# Patient Record
Sex: MALE | Race: WHITE | Employment: OTHER | URBAN - METROPOLITAN AREA
[De-identification: names, ages, dates, MRNs, and addresses within clinical notes are randomized per-mention and may not be internally consistent; named-entity substitution may affect disease eponyms.]

---

## 2019-03-18 ENCOUNTER — OFFICE VISIT (OUTPATIENT)
Dept: SLEEP MEDICINE | Age: 49
End: 2019-03-18
Payer: COMMERCIAL

## 2019-03-18 VITALS
RESPIRATION RATE: 16 BRPM | TEMPERATURE: 98.4 F | BODY MASS INDEX: 45.1 KG/M2 | DIASTOLIC BLOOD PRESSURE: 80 MMHG | OXYGEN SATURATION: 95 % | SYSTOLIC BLOOD PRESSURE: 130 MMHG | HEART RATE: 71 BPM | HEIGHT: 70 IN | WEIGHT: 315 LBS

## 2019-03-18 DIAGNOSIS — G47.33 OSA (OBSTRUCTIVE SLEEP APNEA): Primary | ICD-10-CM

## 2019-03-18 DIAGNOSIS — E66.01 CLASS 3 SEVERE OBESITY DUE TO EXCESS CALORIES WITH SERIOUS COMORBIDITY AND BODY MASS INDEX (BMI) OF 45.0 TO 49.9 IN ADULT (HCC): ICD-10-CM

## 2019-03-18 DIAGNOSIS — I10 ESSENTIAL HYPERTENSION: ICD-10-CM

## 2019-03-18 PROCEDURE — G8417 CALC BMI ABV UP PARAM F/U: HCPCS | Performed by: PSYCHIATRY & NEUROLOGY

## 2019-03-18 PROCEDURE — G8428 CUR MEDS NOT DOCUMENT: HCPCS | Performed by: PSYCHIATRY & NEUROLOGY

## 2019-03-18 PROCEDURE — 99204 OFFICE O/P NEW MOD 45 MIN: CPT | Performed by: PSYCHIATRY & NEUROLOGY

## 2019-03-18 PROCEDURE — G8484 FLU IMMUNIZE NO ADMIN: HCPCS | Performed by: PSYCHIATRY & NEUROLOGY

## 2019-03-18 PROCEDURE — 1036F TOBACCO NON-USER: CPT | Performed by: PSYCHIATRY & NEUROLOGY

## 2019-03-18 RX ORDER — LISINOPRIL AND HYDROCHLOROTHIAZIDE 12.5; 1 MG/1; MG/1
1 TABLET ORAL DAILY
COMMUNITY
Start: 2019-03-03 | End: 2022-04-18 | Stop reason: ALTCHOICE

## 2019-03-18 ASSESSMENT — SLEEP AND FATIGUE QUESTIONNAIRES
ESS TOTAL SCORE: 16
HOW LIKELY ARE YOU TO NOD OFF OR FALL ASLEEP WHILE LYING DOWN TO REST IN THE AFTERNOON WHEN CIRCUMSTANCES PERMIT: 2
HOW LIKELY ARE YOU TO NOD OFF OR FALL ASLEEP WHILE SITTING QUIETLY AFTER LUNCH WITHOUT ALCOHOL: 2
HOW LIKELY ARE YOU TO NOD OFF OR FALL ASLEEP WHILE SITTING AND TALKING TO SOMEONE: 1
HOW LIKELY ARE YOU TO NOD OFF OR FALL ASLEEP WHILE SITTING AND READING: 2
NECK CIRCUMFERENCE (INCHES): 21
HOW LIKELY ARE YOU TO NOD OFF OR FALL ASLEEP WHEN YOU ARE A PASSENGER IN A CAR FOR AN HOUR WITHOUT A BREAK: 2
HOW LIKELY ARE YOU TO NOD OFF OR FALL ASLEEP WHILE SITTING INACTIVE IN A PUBLIC PLACE: 2
HOW LIKELY ARE YOU TO NOD OFF OR FALL ASLEEP WHILE WATCHING TV: 3
HOW LIKELY ARE YOU TO NOD OFF OR FALL ASLEEP IN A CAR, WHILE STOPPED FOR A FEW MINUTES IN TRAFFIC: 2

## 2019-03-18 ASSESSMENT — ENCOUNTER SYMPTOMS
GASTROINTESTINAL NEGATIVE: 1
EYES NEGATIVE: 1
APNEA: 1
CHOKING: 1
ALLERGIC/IMMUNOLOGIC NEGATIVE: 1

## 2019-03-31 ENCOUNTER — HOSPITAL ENCOUNTER (OUTPATIENT)
Dept: SLEEP CENTER | Age: 49
Discharge: HOME | End: 2019-03-31
Payer: COMMERCIAL

## 2019-03-31 DIAGNOSIS — E66.01 CLASS 3 SEVERE OBESITY DUE TO EXCESS CALORIES WITH SERIOUS COMORBIDITY AND BODY MASS INDEX (BMI) OF 45.0 TO 49.9 IN ADULT (HCC): ICD-10-CM

## 2019-03-31 DIAGNOSIS — G47.33 OSA (OBSTRUCTIVE SLEEP APNEA): ICD-10-CM

## 2019-03-31 DIAGNOSIS — I10 ESSENTIAL HYPERTENSION: ICD-10-CM

## 2019-03-31 PROCEDURE — 95810 POLYSOM 6/> YRS 4/> PARAM: CPT

## 2019-03-31 PROCEDURE — 95810 POLYSOM 6/> YRS 4/> PARAM: CPT | Performed by: PSYCHIATRY & NEUROLOGY

## 2019-04-04 ENCOUNTER — TELEPHONE (OUTPATIENT)
Dept: PULMONOLOGY | Age: 49
End: 2019-04-04

## 2019-04-04 NOTE — TELEPHONE ENCOUNTER
Spoke to [address] 89 to let him know that his PSG study indicated he was positive for severe THERESA with and AHI of 71.7 events/hr with of Sats as low as 73% at times  A titration study is indicated  Gave the number to schedule this

## 2020-06-30 ENCOUNTER — HOSPITAL ENCOUNTER (OUTPATIENT)
Dept: ULTRASOUND IMAGING | Age: 50
Discharge: HOME | End: 2020-06-30
Payer: COMMERCIAL

## 2020-06-30 PROCEDURE — 76705 ECHO EXAM OF ABDOMEN: CPT

## 2021-10-14 ENCOUNTER — HOSPITAL ENCOUNTER (OUTPATIENT)
Dept: NON INVASIVE DIAGNOSTICS | Age: 51
Discharge: HOME | End: 2021-10-14
Payer: COMMERCIAL

## 2021-10-14 DIAGNOSIS — R07.9 CHEST PAIN, UNSPECIFIED TYPE: ICD-10-CM

## 2021-10-14 LAB
LV EF: 63 %
LVEF MODALITY: NORMAL

## 2021-10-14 PROCEDURE — 3430000000 HC RX DIAGNOSTIC RADIOPHARMACEUTICAL: Performed by: INTERNAL MEDICINE

## 2021-10-14 PROCEDURE — 93017 CV STRESS TEST TRACING ONLY: CPT

## 2021-10-14 PROCEDURE — A9502 TC99M TETROFOSMIN: HCPCS | Performed by: INTERNAL MEDICINE

## 2021-10-14 PROCEDURE — 78452 HT MUSCLE IMAGE SPECT MULT: CPT

## 2021-10-14 RX ADMIN — TETROFOSMIN 30 MILLICURIE: 1.38 INJECTION, POWDER, LYOPHILIZED, FOR SOLUTION INTRAVENOUS at 09:14

## 2021-10-28 ENCOUNTER — HOSPITAL ENCOUNTER (OUTPATIENT)
Dept: CT IMAGING | Age: 51
Discharge: HOME | End: 2021-10-28
Payer: COMMERCIAL

## 2021-10-28 DIAGNOSIS — K46.9 HERNIA OF ABDOMINAL CAVITY: ICD-10-CM

## 2021-10-28 PROCEDURE — 74150 CT ABDOMEN W/O CONTRAST: CPT

## 2021-11-17 ENCOUNTER — TELEPHONE (OUTPATIENT)
Dept: CARDIOLOGY CLINIC | Age: 51
End: 2021-11-17

## 2021-11-17 NOTE — TELEPHONE ENCOUNTER
Received referral for patient to see Cardiology. Discussed with patient who says he had a stress test and was told it was abnormal. He has a FH of CAD in his father who passed from an MI at the age of 43. Patient does not endorse any SOB or CP. New patient appointment scheduled for 12/21/21.

## 2021-12-20 NOTE — PROGRESS NOTES
[address]  1970    2021    Referring Physician: Ifeanyi Brand MD  Reason for Referral: abnormal stress test     CC: \"My stress test wasn't normal.\"     HPI:  The patient is 49 y.o. male with a past medical history significant for sleep apnea, obesity, and hypertension who presents today for evaluation of an abnormal stress test. He reports an episode of lightheadedness with associated diaphoresis that occurred with walking in October. He denies any recurrence but states he was concerned and felt he should be seen by his primary care physician. He completed a stress test 10/14/21 that suggested reversible ischemia which prompted the visit today. He denied any chest pains when completing his stress test but the ECG portion of the stress test was normal and had no inducible chest pain. He is accompanied by his wife. He states overall he is doing well. He denies any new cardiac sounding complaints and reports occasional episodes of lightheadedness but denies any syncope. He denies any chest pains or worsening shortness of breath. He reports compliance with his medications and tolerating. He reports noncompliance with his CPAP. He denies any abnormal bleeding or bruising. Patient denies exertional chest pain/pressure, dyspnea at rest, worsening HAN, PND, orthopnea, palpitations, weight changes, changes in LE edema, and syncope. He reports chronic LE edema that remains unchanged.      Past Medical History:   Diagnosis Date    Hyperlipidemia     Hypertension      Past Surgical History:   Procedure Laterality Date    ROTATOR CUFF REPAIR Right      Family History   Problem Relation Age of Onset    Other Other         nka family hx sleep apnea     Heart Attack Father     Hypertension Paternal Uncle      Social History     Tobacco Use    Smoking status: Former Smoker     Quit date: 3/18/1991     Years since quittin.7    Smokeless tobacco: Never Used   Substance Use Topics    Alcohol use: Yes     Comment: rarely    Drug use: Never       No Known Allergies  Current Outpatient Medications   Medication Sig Dispense Refill    meloxicam (MOBIC) 15 MG tablet Take 15 mg by mouth daily      fenofibrate (TRICOR) 145 MG tablet Take 145 mg by mouth daily       hydroCHLOROthiazide (HYDRODIURIL) 25 MG tablet Take 25 mg by mouth daily       lisinopril (PRINIVIL;ZESTRIL) 10 MG tablet Take 10 mg by mouth daily       lisinopril-hydroCHLOROthiazide (PRINZIDE;ZESTORETIC) 10-12.5 MG per tablet Take 1 tablet by mouth daily  (Patient not taking: Reported on 12/21/2021)      aspirin 81 MG EC tablet Take 81 mg by mouth daily (Patient not taking: Reported on 12/21/2021)       No current facility-administered medications for this visit. Review of Systems:  · Constitutional: No unanticipated weight loss. There's been no change in energy level, sleep pattern, or activity level. No fevers, chills. · Eyes: No visual changes or diplopia. No scleral icterus. · ENT: No Headaches, hearing loss or vertigo. No mouth sores or sore throat. · Cardiovascular: as reviewed in HPI  · Respiratory: No cough or wheezing, no sputum production. No hemoptysis. · Gastrointestinal: No abdominal pain, appetite loss, blood in stools. No change in bowel or bladder habits. · Genitourinary: No dysuria, trouble voiding, or hematuria. · Musculoskeletal:  No gait disturbance, no joint complaints. · Integumentary: No rash or pruritis. · Neurological: No headache, diplopia, change in muscle strength, numbness or tingling. · Psychiatric: No anxiety or depression. · Endocrine: No temperature intolerance. No excessive thirst, fluid intake, or urination. No tremor. · Hematologic/Lymphatic: No abnormal bruising or bleeding, blood clots or swollen lymph nodes. · Allergic/Immunologic: No nasal congestion or hives.     Physical Exam:   /70   Pulse 83   Ht 5' 10\" (1.778 m) changes. Assessment/Plan:   1) Abnormal stress test.  Images personally reviewed. Multiple defects on the stress test but does not correlate with clinical findings/lack of exertional chest pain. Patient did not have attenuation correction on the stress test.  He denies typical/exertional chest pain. Discussed treatment options including observation, coronary CTA, or coronary angiogram. Patient states he is claustrophobic but willing to try to complete the coronary CTA. Will prescribe beta-blocker prior to testing. 2) Essential hypertension. Controlled. Goal BP <130/80. Continue medical therapy. 3) Lightheadedness. Denies any syncope and unlikely cardiac related. Support measures provided with positional changes and continue to follow with his PCP. 4) Morbid obesity. BMI 45. Encouraged weight loss and increase aerobic exercise as tolerated. 5) THERESA. Encouraged compliance and to follow up with sleep medicine. Follow up pending cardiac testing. Thank you very much for allowing me to participate in the care of your patient. Please do not hesitate to contact me if you have any questions. Sincerely,  Aura Velazquez. Ana Moses, [address] [place] [address]  Ph: [phone]  Fax: [phone]    This note was scribed in the presence of Dr Ana Moses MD by Reyes Points, RN. Physician Attestation: The scribes documentation has been prepared under my direction and personally reviewed by me in its entirety. I confirm that the note above accurately reflects all work, treatment, procedures, and medical decision making performed by me. All portions of the note including but not limited to the chief complaint, history of present illness, physical exam, assessment and plan/medical decision making were personally reviewed, edited, and updated on the day of the visit.

## 2021-12-21 ENCOUNTER — OFFICE VISIT (OUTPATIENT)
Dept: CARDIOLOGY CLINIC | Age: 51
End: 2021-12-21
Payer: COMMERCIAL

## 2021-12-21 VITALS
SYSTOLIC BLOOD PRESSURE: 122 MMHG | WEIGHT: 315 LBS | HEART RATE: 83 BPM | OXYGEN SATURATION: 96 % | HEIGHT: 70 IN | DIASTOLIC BLOOD PRESSURE: 70 MMHG | BODY MASS INDEX: 45.1 KG/M2

## 2021-12-21 DIAGNOSIS — E66.01 MORBID OBESITY (HCC): ICD-10-CM

## 2021-12-21 DIAGNOSIS — I10 ESSENTIAL HYPERTENSION: ICD-10-CM

## 2021-12-21 DIAGNOSIS — R42 LIGHTHEADEDNESS: ICD-10-CM

## 2021-12-21 DIAGNOSIS — R94.39 ABNORMAL STRESS TEST: Primary | ICD-10-CM

## 2021-12-21 DIAGNOSIS — G47.33 OSA (OBSTRUCTIVE SLEEP APNEA): ICD-10-CM

## 2021-12-21 PROCEDURE — G8417 CALC BMI ABV UP PARAM F/U: HCPCS | Performed by: INTERNAL MEDICINE

## 2021-12-21 PROCEDURE — 1036F TOBACCO NON-USER: CPT | Performed by: INTERNAL MEDICINE

## 2021-12-21 PROCEDURE — 3017F COLORECTAL CA SCREEN DOC REV: CPT | Performed by: INTERNAL MEDICINE

## 2021-12-21 PROCEDURE — 99204 OFFICE O/P NEW MOD 45 MIN: CPT | Performed by: INTERNAL MEDICINE

## 2021-12-21 PROCEDURE — 93000 ELECTROCARDIOGRAM COMPLETE: CPT | Performed by: INTERNAL MEDICINE

## 2021-12-21 PROCEDURE — G8484 FLU IMMUNIZE NO ADMIN: HCPCS | Performed by: INTERNAL MEDICINE

## 2021-12-21 PROCEDURE — G8427 DOCREV CUR MEDS BY ELIG CLIN: HCPCS | Performed by: INTERNAL MEDICINE

## 2021-12-21 RX ORDER — HYDROCHLOROTHIAZIDE 25 MG/1
25 TABLET ORAL DAILY
COMMUNITY
Start: 2021-12-16

## 2021-12-21 RX ORDER — METOPROLOL TARTRATE 50 MG/1
TABLET, FILM COATED ORAL
Qty: 8 TABLET | Refills: 1 | Status: SHIPPED | OUTPATIENT
Start: 2021-12-21 | End: 2022-05-25

## 2021-12-21 RX ORDER — FENOFIBRATE 145 MG/1
145 TABLET, COATED ORAL DAILY
COMMUNITY
Start: 2021-12-11 | End: 2022-05-25

## 2021-12-21 RX ORDER — MELOXICAM 15 MG/1
15 TABLET ORAL DAILY
COMMUNITY
End: 2022-05-25

## 2021-12-21 RX ORDER — LISINOPRIL 10 MG/1
10 TABLET ORAL DAILY
COMMUNITY
Start: 2021-12-11

## 2021-12-21 NOTE — PATIENT INSTRUCTIONS
Patient Education        High Blood Pressure: Care Instructions  Overview     It's normal for blood pressure to go up and down throughout the day. But if it stays up, you have high blood pressure. Another name for high blood pressure is hypertension. Despite what a lot of people think, high blood pressure usually doesn't cause headaches or make you feel dizzy or lightheaded. It usually has no symptoms. But it does increase your risk of stroke, heart attack, and other problems. You and your doctor will talk about your risks of these problems based on your blood pressure. Your doctor will give you a goal for your blood pressure. Your goal will be based on your health and your age. Lifestyle changes, such as eating healthy and being active, are always important to help lower blood pressure. You might also take medicine to reach your blood pressure goal.  Follow-up care is a key part of your treatment and safety. Be sure to make and go to all appointments, and call your doctor if you are having problems. It's also a good idea to know your test results and keep a list of the medicines you take. How can you care for yourself at home? Medical treatment  · If you stop taking your medicine, your blood pressure will go back up. You may take one or more types of medicine to lower your blood pressure. Be safe with medicines. Take your medicine exactly as prescribed. Call your doctor if you think you are having a problem with your medicine. · Talk to your doctor before you start taking aspirin every day. Aspirin can help certain people lower their risk of a heart attack or stroke. But taking aspirin isn't right for everyone, because it can cause serious bleeding. · See your doctor regularly. You may need to see the doctor more often at first or until your blood pressure comes down.   · If you are taking blood pressure medicine, talk to your doctor before you take decongestants or anti-inflammatory medicine, such as ibuprofen. Some of these medicines can raise blood pressure. · Learn how to check your blood pressure at home. Lifestyle changes  · Stay at a healthy weight. This is especially important if you put on weight around the waist. Losing even 10 pounds can help you lower your blood pressure. · If your doctor recommends it, get more exercise. Walking is a good choice. Bit by bit, increase the amount you walk every day. Try for at least 30 minutes on most days of the week. You also may want to swim, bike, or do other activities. · Avoid or limit alcohol. Talk to your doctor about whether you can drink any alcohol. · Try to limit how much sodium you eat to less than 2,300 milligrams (mg) a day. Your doctor may ask you to try to eat less than 1,500 mg a day. · Eat plenty of fruits (such as bananas and oranges), vegetables, legumes, whole grains, and low-fat dairy products. · Lower the amount of saturated fat in your diet. Saturated fat is found in animal products such as milk, cheese, and meat. Limiting these foods may help you lose weight and also lower your risk for heart disease. · Do not smoke. Smoking increases your risk for heart attack and stroke. If you need help quitting, talk to your doctor about stop-smoking programs and medicines. These can increase your chances of quitting for good. When should you call for help? Call 911  anytime you think you may need emergency care. This may mean having symptoms that suggest that your blood pressure is causing a serious heart or blood vessel problem. Your blood pressure may be over 180/120. For example, call 911 if:    · You have symptoms of a heart attack. These may include:  ? Chest pain or pressure, or a strange feeling in the chest.  ? Sweating. ? Shortness of breath. ? Nausea or vomiting. ? Pain, pressure, or a strange feeling in the back, neck, jaw, or upper belly or in one or both shoulders or arms. ? Lightheadedness or sudden weakness.   ? A fast or for 12 hours prior to the test  *DO NOT eat/drink anything 4 hours prior to your study (so starting at 5am)  *DO take your regular morning medications except for any diabetic medications (because you will not be eating)    Example of caffeine products/food to avoid:  All chocolate and coffee (including decaffeinated), all  teas, all soft drinks, all energy drinks/products, Excedrin, Anacin, No-doz, Norgesic, Fiorcet, Synalgos-DC, Wigraine (all forms), Payton Dexter (all forms), Cafergot (all forms)

## 2022-01-11 ENCOUNTER — HOSPITAL ENCOUNTER (OUTPATIENT)
Dept: CT IMAGING | Age: 52
Discharge: HOME | End: 2022-01-11
Payer: COMMERCIAL

## 2022-01-11 VITALS
RESPIRATION RATE: 18 BRPM | TEMPERATURE: 97.5 F | DIASTOLIC BLOOD PRESSURE: 60 MMHG | HEIGHT: 70 IN | WEIGHT: 313 LBS | SYSTOLIC BLOOD PRESSURE: 104 MMHG | HEART RATE: 58 BPM | BODY MASS INDEX: 44.81 KG/M2 | OXYGEN SATURATION: 99 %

## 2022-01-11 DIAGNOSIS — R94.39 ABNORMAL STRESS TEST: ICD-10-CM

## 2022-01-11 LAB
ANION GAP SERPL CALCULATED.3IONS-SCNC: 12 MMOL/L (ref 3–16)
BUN BLDV-MCNC: 13 MG/DL (ref 7–20)
CALCIUM SERPL-MCNC: 9 MG/DL (ref 8.3–10.6)
CHLORIDE BLD-SCNC: 103 MMOL/L (ref 99–110)
CO2: 23 MMOL/L (ref 21–32)
CREAT SERPL-MCNC: 0.8 MG/DL (ref 0.9–1.3)
GFR AFRICAN AMERICAN: >60
GFR NON-AFRICAN AMERICAN: >60
GLUCOSE BLD-MCNC: 110 MG/DL (ref 70–99)
PERFORMED ON: NORMAL
POC SAMPLE TYPE: NORMAL
POTASSIUM REFLEX MAGNESIUM: 3.9 MMOL/L (ref 3.5–5.1)
SODIUM BLD-SCNC: 138 MMOL/L (ref 136–145)

## 2022-01-11 PROCEDURE — 36415 COLL VENOUS BLD VENIPUNCTURE: CPT

## 2022-01-11 PROCEDURE — 6370000000 HC RX 637 (ALT 250 FOR IP): Performed by: RADIOLOGY

## 2022-01-11 PROCEDURE — 6360000004 HC RX CONTRAST MEDICATION: Performed by: INTERNAL MEDICINE

## 2022-01-11 PROCEDURE — 75574 CT ANGIO HRT W/3D IMAGE: CPT

## 2022-01-11 PROCEDURE — 80048 BASIC METABOLIC PNL TOTAL CA: CPT

## 2022-01-11 RX ORDER — NITROGLYCERIN 0.4 MG/1
0.4 TABLET SUBLINGUAL ONCE
Status: COMPLETED | OUTPATIENT
Start: 2022-01-11 | End: 2022-01-11

## 2022-01-11 RX ADMIN — IOPAMIDOL 75 ML: 755 INJECTION, SOLUTION INTRAVENOUS at 09:01

## 2022-01-11 RX ADMIN — NITROGLYCERIN 0.4 MG: 0.4 TABLET, ORALLY DISINTEGRATING SUBLINGUAL at 08:53

## 2022-01-11 NOTE — PROGRESS NOTES
0845: Pt here for Coronary CTA. Discussed test with patient and the use of Nitro. Agreeable to proceed. HR 58 in pre op. 0536: No lopressor needed. Nitro given. Ready for final scan    0856:  CTA done. Pt to recovery.

## 2022-01-13 ENCOUNTER — TELEPHONE (OUTPATIENT)
Dept: CARDIOLOGY CLINIC | Age: 52
End: 2022-01-13

## 2022-01-13 RX ORDER — ATORVASTATIN CALCIUM 40 MG/1
40 TABLET, FILM COATED ORAL DAILY
Qty: 90 TABLET | Refills: 2 | Status: SHIPPED | OUTPATIENT
Start: 2022-01-13

## 2022-01-13 NOTE — TELEPHONE ENCOUNTER
Cheyenne Puckett was told to call the office back to let Dr. Dylan Rodas and his RN know what kind of medication he was taking. Per Jonna Rondon RN. .. He states he is currently taking a statin un uncertain which medication he is taking    Cheyenne Puckett states he is taking Fenofibrate 145 mg, taking it once daily.        You can reach Cheyenne Puckett at #[phone]

## 2022-01-13 NOTE — TELEPHONE ENCOUNTER
Called patient and let him know that he needs to be on Lipitor as well. He is agreeable will sent his pharmacy.

## 2022-04-18 ENCOUNTER — OFFICE VISIT (OUTPATIENT)
Dept: SLEEP MEDICINE | Age: 52
End: 2022-04-18
Payer: COMMERCIAL

## 2022-04-18 VITALS
DIASTOLIC BLOOD PRESSURE: 80 MMHG | RESPIRATION RATE: 20 BRPM | HEART RATE: 75 BPM | TEMPERATURE: 96.9 F | WEIGHT: 315 LBS | SYSTOLIC BLOOD PRESSURE: 130 MMHG | HEIGHT: 70 IN | OXYGEN SATURATION: 98 % | BODY MASS INDEX: 45.1 KG/M2

## 2022-04-18 DIAGNOSIS — E66.01 CLASS 3 SEVERE OBESITY DUE TO EXCESS CALORIES WITH SERIOUS COMORBIDITY AND BODY MASS INDEX (BMI) OF 45.0 TO 49.9 IN ADULT (HCC): ICD-10-CM

## 2022-04-18 DIAGNOSIS — R06.81 WITNESSED EPISODE OF APNEA: ICD-10-CM

## 2022-04-18 DIAGNOSIS — R06.83 SNORING: ICD-10-CM

## 2022-04-18 DIAGNOSIS — I10 ESSENTIAL HYPERTENSION: ICD-10-CM

## 2022-04-18 DIAGNOSIS — G47.19 EXCESSIVE DAYTIME SLEEPINESS: ICD-10-CM

## 2022-04-18 DIAGNOSIS — G47.33 OSA (OBSTRUCTIVE SLEEP APNEA): Primary | ICD-10-CM

## 2022-04-18 PROCEDURE — 3017F COLORECTAL CA SCREEN DOC REV: CPT | Performed by: PSYCHIATRY & NEUROLOGY

## 2022-04-18 PROCEDURE — G8427 DOCREV CUR MEDS BY ELIG CLIN: HCPCS | Performed by: PSYCHIATRY & NEUROLOGY

## 2022-04-18 PROCEDURE — 1036F TOBACCO NON-USER: CPT | Performed by: PSYCHIATRY & NEUROLOGY

## 2022-04-18 PROCEDURE — 99203 OFFICE O/P NEW LOW 30 MIN: CPT | Performed by: PSYCHIATRY & NEUROLOGY

## 2022-04-18 PROCEDURE — G8417 CALC BMI ABV UP PARAM F/U: HCPCS | Performed by: PSYCHIATRY & NEUROLOGY

## 2022-04-18 ASSESSMENT — ENCOUNTER SYMPTOMS
APNEA: 1
GASTROINTESTINAL NEGATIVE: 1
EYES NEGATIVE: 1
ALLERGIC/IMMUNOLOGIC NEGATIVE: 1

## 2022-04-18 ASSESSMENT — SLEEP AND FATIGUE QUESTIONNAIRES
HOW LIKELY ARE YOU TO NOD OFF OR FALL ASLEEP WHILE SITTING AND READING: 2
HOW LIKELY ARE YOU TO NOD OFF OR FALL ASLEEP WHILE SITTING QUIETLY AFTER LUNCH WITHOUT ALCOHOL: 2
ESS TOTAL SCORE: 15
HOW LIKELY ARE YOU TO NOD OFF OR FALL ASLEEP WHEN YOU ARE A PASSENGER IN A CAR FOR AN HOUR WITHOUT A BREAK: 2
HOW LIKELY ARE YOU TO NOD OFF OR FALL ASLEEP WHILE LYING DOWN TO REST IN THE AFTERNOON WHEN CIRCUMSTANCES PERMIT: 2
HOW LIKELY ARE YOU TO NOD OFF OR FALL ASLEEP WHILE SITTING AND TALKING TO SOMEONE: 1
HOW LIKELY ARE YOU TO NOD OFF OR FALL ASLEEP WHILE SITTING INACTIVE IN A PUBLIC PLACE: 3
HOW LIKELY ARE YOU TO NOD OFF OR FALL ASLEEP IN A CAR, WHILE STOPPED FOR A FEW MINUTES IN TRAFFIC: 1
HOW LIKELY ARE YOU TO NOD OFF OR FALL ASLEEP WHILE WATCHING TV: 2

## 2022-04-18 NOTE — PROGRESS NOTES
MD GILDA Foster Board Certified in Sleep Medicine  Certified in [address] [place] [address] Certified in Neurology [address]  [address] [place] EDITH Russo  [phone]   [address] [place] [address], [facility] [address] [place]                      [address]  [address] [address] [phone]    Subjective:     Patient ID: Kvng Jackson is a 49 y.o. male. Chief Complaint   Patient presents with    [facility]    Sleep Apnea       HPI:        Kvng Jackson is a 49 y.o. male was seen today as a follow for obstructive sleep apnea. The patient underwent comprehensive polysomnogram on 2019, the overnight registration revealed severe obstructive sleep apnea with apnea hypopnea index of 89.5/hr with lowest O2 saturation of 73%, patient spent about 60 minutes below 90%. No change in his weight in the last 3 years. The Patient scored Total score: 15 on [place] Sleepiness Scale ( more than 10 is indicative of daytime sleepiness)   He lost the follow up after his wife got sick. BP is stable. Still has loud snoring, EDS, snorting and gasping for breath.   DOT/CDL - N/A        Previous Report(s)Reviewed: historical medical records         Social History     Socioeconomic History    Marital status:      Spouse name: Not on file    Number of children: Not on file    Years of education: Not on file    Highest education level: Not on file   Occupational History    Not on file   Tobacco Use    Smoking status: Former Smoker     Quit date: 3/18/1991     Years since quittin.1    Smokeless tobacco: Never Used   Substance and Sexual Activity    Alcohol use: Yes     Comment: rarely    Drug use: Never    Sexual activity: Yes     Partners: Female   Other Topics Concern    Not on file   Social History Narrative    Not on file     Social Determinants of Health     Financial Resource Strain:     Difficulty of Paying Living Expenses: Not on file   Food Insecurity:     Worried About Running Out of Food in the Last Year: Not on file    Son of Food in the Last Year: Not on file   Transportation Needs:     Lack of Transportation (Medical): Not on file    Lack of Transportation (Non-Medical): Not on file   Physical Activity:     Days of Exercise per Week: Not on file    Minutes of Exercise per Session: Not on file   Stress:     Feeling of Stress : Not on file   Social Connections:     Frequency of Communication with Friends and Family: Not on file    Frequency of Social Gatherings with Friends and Family: Not on file    Attends [employer] Services: Not on file    Active Member of [address] [place] [address] [employer] or Organizations: Not on file    Attends Club or Organization Meetings: Not on file    Marital Status: Not on file   Intimate Partner Violence:     Fear of Current or Ex-Partner: Not on file    Emotionally Abused: Not on file    Physically Abused: Not on file    Sexually Abused: Not on file   Housing Stability:     Unable to Pay for Housing in the Last Year: Not on file    Number of [place] in the Last Year: Not on file    Unstable Housing in the Last Year: Not on file       Prior to Admission medications    Medication Sig Start Date End Date Taking?  Authorizing Provider   atorvastatin (LIPITOR) 40 MG tablet Take 1 tablet by mouth daily 1/13/22  Yes Carito Doherty MD   meloxicam (MOBIC) 15 MG tablet Take 15 mg by mouth daily   Yes Historical Provider, MD   fenofibrate (TRICOR) 145 MG tablet Take 145 mg by mouth daily  12/11/21  Yes Historical Provider, MD   hydroCHLOROthiazide (HYDRODIURIL) 25 MG tablet Take 25 mg by mouth daily  12/16/21  Yes Historical Provider, MD   lisinopril (PRINIVIL;ZESTRIL) 10 MG tablet Take 10 mg by mouth daily  12/11/21  Yes Historical Provider, MD   metoprolol tartrate (LOPRESSOR) 50 MG tablet Take one tablet twice a day for 3 days prior to cardiac CTA. 12/21/21  Yes Mahad Queen MD       Allergies as of 04/18/2022    (No Known Allergies)       Patient Active Problem List   Diagnosis    THERESA (obstructive sleep apnea)    PLMD (periodic limb movement disorder)    Essential hypertension       Past Medical History:   Diagnosis Date    Hyperlipidemia     Hypertension     Sleep apnea        Past Surgical History:   Procedure Laterality Date    ROTATOR CUFF REPAIR Right        Family History   Problem Relation Age of Onset    Other Other         nka family hx sleep apnea     Heart Attack Father     Hypertension Paternal Uncle        Review of Systems   Constitutional: Positive for fatigue. HENT: Negative. Eyes: Negative. Respiratory: Positive for apnea. Cardiovascular: Positive for leg swelling. Gastrointestinal: Negative. Endocrine: Negative. Genitourinary: Positive for frequency (1-2). Musculoskeletal: Positive for arthralgias and myalgias. Allergic/Immunologic: Negative. Neurological: Negative for headaches. Hematological: Negative. Psychiatric/Behavioral: Negative. Objective:     Vitals:  Weight BMI Neck circumference    Wt Readings from Last 3 Encounters:   04/18/22 (!) 324 lb 3.2 oz (147.1 kg)   01/11/22 (!) 313 lb (142 kg)   12/21/21 (!) 316 lb 6.4 oz (143.5 kg)    Body mass index is 46.52 kg/m². BP HR SaO2   BP Readings from Last 3 Encounters:   04/18/22 130/80   01/11/22 104/60   12/21/21 122/70    Pulse Readings from Last 3 Encounters:   04/18/22 75   01/11/22 58   12/21/21 83    SpO2 Readings from Last 3 Encounters:   04/18/22 98%   01/11/22 99%   12/21/21 96%        Themandibular molar Class :   [x]1 []2 []3      Mallampati I Airway Classification:   []1 []2 []3 [x]4      Physical Exam  Vitals and nursing note reviewed. Constitutional:       Appearance: He is obese. HENT:      Head: Atraumatic. Nose: Congestion present. Mouth/Throat:      Mouth: Mucous membranes are moist.      Comments: Mallampati class 4, no retrognathia or hypognathia , normal airflow in bilateral nostrils, no septum deviation , crowded oropharynx with low soft palate, no tonsils enlargement. Cardiovascular:      Rate and Rhythm: Normal rate and regular rhythm. Pulmonary:      Effort: Pulmonary effort is normal.      Breath sounds: Normal breath sounds. Musculoskeletal:      Right lower leg: Edema present. Left lower leg: Edema present. Skin:     General: Skin is warm. Neurological:      General: No focal deficit present.         :   Severe Obstructive Sleep Apnea/Hypopnea Syndrome, has not tried the CPAP 3 years ago and lost the follow up. Diagnosis Orders   1. THERESA (obstructive sleep apnea)  Sleep Study with PAP Titration   2. Essential hypertension  Sleep Study with PAP Titration   3. Class 3 severe obesity due to excess calories with serious comorbidity and body mass index (BMI) of 45.0 to 49.9 in adult [facility])  Sleep Study with PAP Titration   4. Excessive daytime sleepiness  Sleep Study with PAP Titration   5. Snoring  Sleep Study with PAP Titration   6. Witnessed episode of apnea  Sleep Study with PAP Titration     Plan:   Split night. Patient was counseled about the pathophysiology of obstructive sleep apnea syndrome and the methods for evaluating its presence and severity. Patient was counseled to avoid driving and other potentially hazardous circumstances if the patient is experiencing excessive sleepiness.   Treatment considerations include the use of nasal CPAP, oral dental appliance or a surgical intervention, which should be based on otolarygologic findings, In the meantime, the patient should be cautioned to avoid the use of alcohol or other depressant medications because of potential for increasing the duration and severity of apnea and cautioned regarding driving or operating and dangerous equipment if the patient is experiencing daytime sleepiness. .  Most likely treating the THERESA will have positive impact on HTN control. The Patient accepts referral to bariatrics for further consideration of weight loss methods. Most likely the patient will benefit from the gastric sleeve surgery in treating of the obstructive sleep apnea. In 2013, in a study published in Obesity Surgery Journal  A total of 69 studies with 13,900 patients were included and revealed that all the procedures achieved profound effects on THERESA, as over 75 % of patients saw at least an improvement in their sleep apnea, bariatric surgery is a definitive treatment for obstructive sleep apnea, regardless of the specific type of surgery. We discussed the proportionality between weight and AHI. With 10% weight change, the AHI has a 27% proportionate change. With 20% weight change, the AHI has a 45-50% proportionate change. Orders Placed This Encounter   Procedures    Sleep Study with PAP Titration       Return for F/U between 31 and 90 days from the recieving CPAP.     Fartun Denis MD  Medical Director [facility]

## 2022-04-18 NOTE — PATIENT INSTRUCTIONS
Orders Placed This Encounter   Procedures    Sleep Study with PAP Titration     Standing Status:   Future     Standing Expiration Date:   4/18/2023     Order Specific Question:   Sleep Study Titration Type     Answer:   Split Night Study (Baseline followed by PAP Titration)     Order Specific Question:   Location For Sleep Study     Answer:   [place]     Order Specific Question:   Select Sleep Lab Location     Answer:   [facility]        Patient Education        Learning About CPAP for Sleep Apnea  What is CPAP? CPAP is a small machine that you use at home every night while you sleep. It increases air pressure in your throat to keep your airway open. When you have sleep apnea, this can help you sleep better, feel better, and avoid futurehealth problems. CPAP stands for \"continuous positive airway pressure. \"  The CPAP machine will have one of the following:   A mask that covers your nose and mouth   A mask that covers your nose only   A nasal pillow that covers only the openings of your nose  Why is it done? CPAP is usually the best treatment for obstructive sleep apnea. It is the firsttreatment choice and the most widely used. CPAP:   Helps you have more normal sleep, so you feel less sleepy and more alert during the daytime.  May help keep heart failure or other heart problems from getting worse.  May help lower your blood pressure. If you have a bed partner, they may also sleep better when you use a CPAP. That's because you aren't snoring or restless. Your doctor may suggest CPAP if you have:   Moderate to severe sleep apnea.  Sleep apnea and coronary artery disease (CAD).  Sleep apnea and heart failure. What are the side effects? Some people who use CPAP have:   A dry or stuffy nose and a sore throat.  Irritated skin on the face.  Bloating. How can you care for yourself? If using CPAP is not comfortable, or if you have certain side effects, workwith your doctor to fix them. Here are some things you can try:   Be sure the mask, nasal mask, or nasal pillow fits well.  See if your doctor can adjust the pressure of your CPAP.  If your nose or mouth is dry, set the machine to deliver warmer or wetter air. Or try using a humidifier.  If your nose is runny or stuffy, talk to your doctor about using a decongestant medicine or steroid nasal spray. Be safe with medicines. Read and follow all instructions on the label. Do not use the medicine longer than the label says.  Your doctor may also help you with problems like swallowing air, bloating, or claustrophobia. Talk to your doctor if you're still having problems. If these things don'thelp, you might try a different type of machine. Where can you learn more? Go to [email or web address].Instagram. org and sign in to your [employer] account. Enter U597 in the [employer] box to learn more about \"Learning About CPAP for Sleep Apnea. \"     If you do not have an account, please click on the \"Sign Up Now\" link. Current as of: July 6, 2021               Content Version: 13.2  © 4300-7508 Healthwise, Incorporated. Care instructions adapted under license by [facility] ([facility]). If you have questions about a medical condition or this instruction, always ask your healthcare professional. [address]  any warranty or liability for your use of this information.

## 2022-05-05 ENCOUNTER — HOSPITAL ENCOUNTER (OUTPATIENT)
Dept: SLEEP CENTER | Age: 52
Discharge: HOME | End: 2022-05-05
Payer: COMMERCIAL

## 2022-05-05 DIAGNOSIS — I10 ESSENTIAL HYPERTENSION: ICD-10-CM

## 2022-05-05 DIAGNOSIS — G47.33 OSA (OBSTRUCTIVE SLEEP APNEA): ICD-10-CM

## 2022-05-05 DIAGNOSIS — G47.19 EXCESSIVE DAYTIME SLEEPINESS: ICD-10-CM

## 2022-05-05 DIAGNOSIS — R06.81 WITNESSED EPISODE OF APNEA: ICD-10-CM

## 2022-05-05 DIAGNOSIS — R06.83 SNORING: ICD-10-CM

## 2022-05-05 DIAGNOSIS — E66.01 CLASS 3 SEVERE OBESITY DUE TO EXCESS CALORIES WITH SERIOUS COMORBIDITY AND BODY MASS INDEX (BMI) OF 45.0 TO 49.9 IN ADULT (HCC): ICD-10-CM

## 2022-05-05 PROCEDURE — 95811 POLYSOM 6/>YRS CPAP 4/> PARM: CPT

## 2022-05-10 ENCOUNTER — TELEPHONE (OUTPATIENT)
Dept: PULMONOLOGY | Age: 52
End: 2022-05-10

## 2022-05-10 PROCEDURE — 95811 POLYSOM 6/>YRS CPAP 4/> PARM: CPT | Performed by: PSYCHIATRY & NEUROLOGY

## 2022-05-10 NOTE — TELEPHONE ENCOUNTER
Sleep study showed severe THERESA. AHI was 73.8 per hr. And O2 Desaturations to 75%. Dr Clem García:    1. Follow up with the patient's sleep physician to discuss results  2. CPAP pressure of 17cm  3. Avoid sedatives, alcohol and caffeinated drinks at bedtime. 4. Avoid driving while sleepy. Reminders:   Patient will need appointment 30-45 days after start PAP machine    The patient has been notified of this information and all questions answered.       DME:  [facility] -- expedite

## 2022-05-21 ENCOUNTER — HOSPITAL ENCOUNTER (EMERGENCY)
Age: 52
Discharge: HOME | End: 2022-05-21
Attending: EMERGENCY MEDICINE
Payer: COMMERCIAL

## 2022-05-21 ENCOUNTER — APPOINTMENT (OUTPATIENT)
Dept: GENERAL RADIOLOGY | Age: 52
End: 2022-05-21
Payer: COMMERCIAL

## 2022-05-21 VITALS
SYSTOLIC BLOOD PRESSURE: 144 MMHG | RESPIRATION RATE: 22 BRPM | HEART RATE: 95 BPM | DIASTOLIC BLOOD PRESSURE: 81 MMHG | WEIGHT: 315 LBS | HEIGHT: 71 IN | OXYGEN SATURATION: 97 % | TEMPERATURE: 98.8 F | BODY MASS INDEX: 44.1 KG/M2

## 2022-05-21 DIAGNOSIS — R79.89 ELEVATED LFTS: ICD-10-CM

## 2022-05-21 DIAGNOSIS — Z87.898 HISTORY OF TACHYCARDIA: Primary | ICD-10-CM

## 2022-05-21 LAB
A/G RATIO: 1.5 (ref 1.1–2.2)
ALBUMIN SERPL-MCNC: 4.1 G/DL (ref 3.4–5)
ALP BLD-CCNC: 129 U/L (ref 40–129)
ALT SERPL-CCNC: 102 U/L (ref 10–40)
ANION GAP SERPL CALCULATED.3IONS-SCNC: 15 MMOL/L (ref 3–16)
AST SERPL-CCNC: 75 U/L (ref 15–37)
BASOPHILS ABSOLUTE: 0.1 K/UL (ref 0–0.2)
BASOPHILS RELATIVE PERCENT: 0.8 %
BILIRUB SERPL-MCNC: 0.6 MG/DL (ref 0–1)
BUN BLDV-MCNC: 15 MG/DL (ref 7–20)
CALCIUM SERPL-MCNC: 8.7 MG/DL (ref 8.3–10.6)
CHLORIDE BLD-SCNC: 102 MMOL/L (ref 99–110)
CO2: 20 MMOL/L (ref 21–32)
CREAT SERPL-MCNC: 0.6 MG/DL (ref 0.9–1.3)
EOSINOPHILS ABSOLUTE: 0.2 K/UL (ref 0–0.6)
EOSINOPHILS RELATIVE PERCENT: 2.4 %
GFR AFRICAN AMERICAN: >60
GFR NON-AFRICAN AMERICAN: >60
GLUCOSE BLD-MCNC: 206 MG/DL (ref 70–99)
HCT VFR BLD CALC: 44.8 % (ref 40.5–52.5)
HEMOGLOBIN: 15.4 G/DL (ref 13.5–17.5)
LYMPHOCYTES ABSOLUTE: 2.1 K/UL (ref 1–5.1)
LYMPHOCYTES RELATIVE PERCENT: 29.6 %
MCH RBC QN AUTO: 31 PG (ref 26–34)
MCHC RBC AUTO-ENTMCNC: 34.4 G/DL (ref 31–36)
MCV RBC AUTO: 90.2 FL (ref 80–100)
MONOCYTES ABSOLUTE: 0.6 K/UL (ref 0–1.3)
MONOCYTES RELATIVE PERCENT: 7.9 %
NEUTROPHILS ABSOLUTE: 4.3 K/UL (ref 1.7–7.7)
NEUTROPHILS RELATIVE PERCENT: 59.3 %
PDW BLD-RTO: 13.4 % (ref 12.4–15.4)
PLATELET # BLD: 192 K/UL (ref 135–450)
PMV BLD AUTO: 7.9 FL (ref 5–10.5)
POTASSIUM SERPL-SCNC: 3.9 MMOL/L (ref 3.5–5.1)
RBC # BLD: 4.96 M/UL (ref 4.2–5.9)
SODIUM BLD-SCNC: 137 MMOL/L (ref 136–145)
TOTAL PROTEIN: 6.8 G/DL (ref 6.4–8.2)
TROPONIN: <0.01 NG/ML
TSH REFLEX: 1.87 UIU/ML (ref 0.27–4.2)
WBC # BLD: 7.2 K/UL (ref 4–11)

## 2022-05-21 PROCEDURE — 71046 X-RAY EXAM CHEST 2 VIEWS: CPT

## 2022-05-21 PROCEDURE — 80053 COMPREHEN METABOLIC PANEL: CPT

## 2022-05-21 PROCEDURE — 93005 ELECTROCARDIOGRAM TRACING: CPT | Performed by: PHYSICIAN ASSISTANT

## 2022-05-21 PROCEDURE — 99285 EMERGENCY DEPT VISIT HI MDM: CPT

## 2022-05-21 PROCEDURE — 36415 COLL VENOUS BLD VENIPUNCTURE: CPT

## 2022-05-21 PROCEDURE — 84484 ASSAY OF TROPONIN QUANT: CPT

## 2022-05-21 PROCEDURE — 84443 ASSAY THYROID STIM HORMONE: CPT

## 2022-05-21 PROCEDURE — 85025 COMPLETE CBC W/AUTO DIFF WBC: CPT

## 2022-05-21 ASSESSMENT — ENCOUNTER SYMPTOMS
VOMITING: 0
ABDOMINAL PAIN: 0
DIARRHEA: 0
COLOR CHANGE: 0
SHORTNESS OF BREATH: 0
BACK PAIN: 0

## 2022-05-21 ASSESSMENT — PAIN - FUNCTIONAL ASSESSMENT
PAIN_FUNCTIONAL_ASSESSMENT: NONE - DENIES PAIN
PAIN_FUNCTIONAL_ASSESSMENT: NONE - DENIES PAIN

## 2022-05-21 NOTE — ED PROVIDER NOTES
[address] [facility]      Pt Name: Caleb Dooley  MRN: [id number]  [place] 1970  Date of evaluation: 5/21/2022  Provider: SHAYNE Crowley    This patient was seen and evaluated by the attending physician Dr. Sobia Logan. CHIEF COMPLAINT       Chief Complaint   Patient presents with    Irregular Heart Beat       CRITICAL CARE TIME   I performed a total Critical Care time of 20 minutes, excluding separately reportable procedures. There was a high probability of clinically significant/life threatening deterioration in the patient's condition which required my urgent intervention. Not limited to multiple reexaminations, discussions with attending physician and consultants. HISTORY OF PRESENT ILLNESS  (Location/Symptom, Timing/Onset, Context/Setting, Quality, Duration, Modifying Factors, Severity.)   Caleb Dooley is a 49 y.o. male who presents to the emergency department via EMS accompanied by his daughter. He states he was at home around 545 standing doing his wife's hair when he suddenly felt his heart pounding very quickly and very sweaty. They called 911 and EMS arrived and reported that the patient was in SVT with a heart rate of 200. The the patient bear down and he converted. He states he has no pain, shortness of breath palpitation sensation currently. He has not been ill recently no fevers vomiting diarrhea or abdominal pain. He has a past medical history of hypertension hyperlipidemia. He has been out of his HCTZ for about 3 days. Denies personal or family history of DVT, PE or CAD. Nursing Notes were reviewed and I agree. REVIEW OF SYSTEMS    (2-9 systems for level 4, 10 or more for level 5)     Review of Systems   Constitutional: Positive for diaphoresis (resolved). Respiratory: Negative for shortness of breath. Cardiovascular: Positive for palpitations (Resolved). Negative for chest pain. Gastrointestinal: Negative for abdominal pain, diarrhea and vomiting. Musculoskeletal: Negative for back pain, neck pain and neck stiffness. Skin: Negative for color change, rash and wound. Neurological: Negative for weakness, numbness and headaches. Psychiatric/Behavioral: Negative for agitation, behavioral problems and confusion. Except as noted above the remainder of the review of systems was reviewed and negative. PAST MEDICAL HISTORY         Diagnosis Date    Hyperlipidemia     Hypertension     Sleep apnea        SURGICAL HISTORY           Procedure Laterality Date    ROTATOR CUFF REPAIR Right        CURRENT MEDICATIONS       Discharge Medication List as of 2022  8:31 PM      CONTINUE these medications which have NOT CHANGED    Details   atorvastatin (LIPITOR) 40 MG tablet Take 1 tablet by mouth daily, Disp-90 tablet, R-2Normal      meloxicam (MOBIC) 15 MG tablet Take 15 mg by mouth dailyHistorical Med      fenofibrate (TRICOR) 145 MG tablet Take 145 mg by mouth daily Historical Med      hydroCHLOROthiazide (HYDRODIURIL) 25 MG tablet Take 25 mg by mouth daily Historical Med      lisinopril (PRINIVIL;ZESTRIL) 10 MG tablet Take 10 mg by mouth daily Historical Med      metoprolol tartrate (LOPRESSOR) 50 MG tablet Take one tablet twice a day for 3 days prior to cardiac CTA., Disp-8 tablet, R-1Normal             ALLERGIES     Patient has no known allergies. FAMILY HISTORY           Problem Relation Age of Onset    Other Other         nka family hx sleep apnea     Heart Attack Father     Hypertension Paternal Uncle      Family Status   Relation Name Status    Other  (Not Specified)    Father      PUn  (Not Specified)    Mother          SOCIAL HISTORY      reports that he quit smoking about 31 years ago. He has never used smokeless tobacco. He reports current alcohol use. He reports that he does not use drugs.     PHYSICAL EXAM    (up to 7 for level 4, 8 or more for level 5)     ED Triage Vitals   BP Temp Temp src Pulse Resp SpO2 Height Weight   -- -- -- -- -- -- -- --       Physical Exam  Vitals and nursing note reviewed. Constitutional:       Appearance: Normal appearance. He is not ill-appearing. HENT:      Head: Normocephalic and atraumatic. Mouth/Throat:      Mouth: Mucous membranes are moist.   Eyes:      Pupils: Pupils are equal, round, and reactive to light. Cardiovascular:      Rate and Rhythm: Normal rate. Pulses: Normal pulses. Pulmonary:      Effort: Pulmonary effort is normal. No respiratory distress. Abdominal:      Tenderness: There is no abdominal tenderness. There is no guarding. Musculoskeletal:         General: No swelling or tenderness. Normal range of motion. Cervical back: Normal range of motion. Skin:     General: Skin is warm. Neurological:      General: No focal deficit present. Mental Status: He is alert and oriented to person, place, and time. Cranial Nerves: No cranial nerve deficit. Motor: No weakness. Psychiatric:         Mood and Affect: Mood normal.         Behavior: Behavior normal.         DIAGNOSTIC RESULTS     EKG: All EKG's are interpreted by SHAYNE Solis in the absence of a cardiologist.    EKG interpreted by myself - please refer to attending physician's note for complete EKG interpretation:    Rhythm: sinus rhythm   Rate: Normal  No evidence of acute ischemia or injury. RADIOLOGY:   Non-plain film images such as CT, Ultrasound and MRI are read by the radiologist. Plain radiographic images are visualized and preliminarily interpreted by SHAYNE Solis with the below findings:    Reviewed radiologist's interpretation. Interpretation per the Radiologist below, if available at the time of this note:    XR CHEST (2 VW)   Final Result   No acute cardiopulmonary disease.                LABS:  Labs Reviewed   COMPREHENSIVE METABOLIC PANEL - Abnormal; Notable for the following components:       Result Value    CO2 20 (*)     Glucose 206 (*)     CREATININE 0.6 (*)      (*)     AST 75 (*)     All other components within normal limits   TSH WITH REFLEX   CBC WITH AUTO DIFFERENTIAL   TROPONIN       All other labs were within normal range or not returned as of this dictation. EMERGENCY DEPARTMENT COURSE and DIFFERENTIAL DIAGNOSIS/MDM:   Vitals:    Vitals:    05/21/22 1945 05/21/22 2000 05/21/22 2015 05/21/22 2031   BP: (!) 148/83 (!) 146/69 139/75 (!) 144/81   Pulse: 96 102 93 95   Resp: 24 24 19 22   Temp:       TempSrc:       SpO2: 96% 97% 98% 97%   Weight:       Height:         I discussed with Dick Khan and/or family the exam results, diagnosis, care, prognosis, reasons to return and the importance of follow up. Patient and/or family is in full agreement with plan and all questions have been answered. Specific discharge instructions explained, including reasons to return to the emergency department. Dick Khan is well appearing, non-toxic, and afebrile at the time of discharge. Patient is afebrile not tachycardic and in sinus rhythm with a heart rate in the 90s on arrival.  He is not hypoxic. He states that he held his breath when the EMS had asked him to bear down prior to arrival.  Per EMS he was in SVT at 200. Lab work and chest x-ray as above. Will discharge back to his primary care doctor and cardiologist return for new, worsening or other concerns. Did advise him of the elevated LFTs. Avoid alcohol and Tylenol. I estimate there is LOW risk for PULMONARY EMBOLISM, ACUTE CORONARY SYNDROME, THORACIC AORTIC DISSECTION, STROKE, TRANSIENT ISCHEMIC ATTACK, HEMORRHAGE, OR CARDIAC ARRHYTHMIA, thus I consider the discharge disposition reasonable. CONSULTS:  None    PROCEDURES:  Procedures      FINAL IMPRESSION      1. History of tachycardia    2.  Elevated LFTs          DISPOSITION/PLAN   DISPOSITION Decision To Discharge 05/21/2022 08:29:49 PM      PATIENT REFERRED TO:  Tiehsa Hale MD  [address] [phone]    Call in 2 days  For follow up    Soham Urena MD  [address]  [address] [phone]    Call in 2 days  For follow up      [address] [place]:  Discharge Medication List as of 5/21/2022  8:31 PM          (Please note that portions of this note were completed with a voice recognition program.  Efforts were made to edit the dictations but occasionally words are mis-transcribed.)    Lu Thomason[place]our[place], [place]  05/21/22 2055

## 2022-05-22 LAB
EKG ATRIAL RATE: 97 BPM
EKG DIAGNOSIS: NORMAL
EKG P AXIS: 63 DEGREES
EKG P-R INTERVAL: 146 MS
EKG Q-T INTERVAL: 370 MS
EKG QRS DURATION: 98 MS
EKG QTC CALCULATION (BAZETT): 469 MS
EKG R AXIS: 21 DEGREES
EKG T AXIS: 52 DEGREES
EKG VENTRICULAR RATE: 97 BPM

## 2022-05-22 PROCEDURE — 93010 ELECTROCARDIOGRAM REPORT: CPT | Performed by: INTERNAL MEDICINE

## 2022-05-22 NOTE — ED PROVIDER NOTES
[address] [facility]      Pt Name: Kendra Encarnacion  MRN: [id number]  [place] 1970  Date of evaluation: 5/21/2022  Provider: Savage Byrne [facility]  Chief Complaint   Patient presents with    Irregular Heart Beat       I have fully participated in the care of Kendra Encarnacion and have had a face-to-face evaluation. I have reviewed and agree with all pertinent clinical information, and midlevel provider's history, and physical exam. I have also reviewed the labs, EKG, and imaging studies and treatment plan. I have also reviewed and agree with the medications, allergies and past medical history section for this Kendra Encarnacion. I agree with the diagnosis, and I concur. I wore personal protective equipment when I was in the room the entire time. This includes gloves, N95 mask, face shield, and a glove over my stethoscope for protection. Past Medical History:   Diagnosis Date    Hyperlipidemia     Hypertension     Sleep apnea        MDM:  Kendra Encarnacion is a 49 y.o. male who presents with fast heart rate that occurred at home. He states it was pounding. He states he just finished tying his wife's hair and then developed this pounding sensation is walking away. It would not go away so they called 911. On their arrival they had the patient Valsalva and he converted to normal sinus rhythm. He then came in the emergency department to be evaluated. He denies any previous heart history. He states he did have chest tightness when his heart rate was fast.  He denies any stimulants. This includes weight loss medicines, decongestants, caffeine, migraine medicines. Physical exam at this time appears unremarkable. His heart is regular rate and rhythm without murmurs clicks or rubs. Abdomen soft and nontender. Extremities there is no edema. Pulses are 2+4 bilaterally. Heart rate is normal at this time.   Laboratory work was nondiagnostic. EKG was negative. Therefore, patient was discharged to follow-up with his cardiologist.  He was instructed return if any problems. Chest x-ray was negative. Vitals:    05/21/22 1902   BP: (!) 157/77   Pulse: 94   Resp: 18   Temp: 98.8 °F (37.1 °C)   SpO2: 97%       Lab results  Labs Reviewed   COMPREHENSIVE METABOLIC PANEL - Abnormal; Notable for the following components:       Result Value    CO2 20 (*)     Glucose 206 (*)     CREATININE 0.6 (*)      (*)     AST 75 (*)     All other components within normal limits   TSH WITH REFLEX   CBC WITH AUTO DIFFERENTIAL   TROPONIN       EKG Results  EKG Interpretation    Interpreted by emergency department physician  Time performed: 2223  Time read: 1850    Rhythm: Sinus  Ventricular Rate: 97  QRS Axis: 21  Ectopy: None  Conduction: Normal sinus rhythm with early repolarization abnormalities  ST Segments: Consistent with early repolarization abnormalities  T Waves: Consistent with early polarization abnormalities  Q Waves: None noted    Other findings: Motion artifact but EKG is readable. There is nonspecific interventricular conduction delay    Compared to EKG on: 12/21/2021 appears unchanged    Clinical Impression: Normal sinus rhythm with early repolarization abnormalities. There is motion artifact but EKG is readable. There is a nonspecific interventricular conduction delay. This is compared to an EKG on 12/21/2021 and appears unchanged. Nancy 149, DO    Radiology results  XR CHEST (2 VW)   Final Result   No acute cardiopulmonary disease. Medications - No data to display    New Prescriptions    No medications on file       The patient's blood pressure was found to be elevated according to CMS/Medicare and the Affordable Care Act/ObamaCare criteria.  Elevated blood pressure could occur because of pain or anxiety or other reasons and does not mean that they need to have their blood pressure treated or medications otherwise adjusted. However, this could also be a sign that they will need to have their blood pressure treated or medications changed. The patient was instructed to follow up closely with their personal physician to have their blood pressure rechecked. The patient was instructed to take a list of recent blood pressure readings to their next visit with their personal physician. IMPRESSIONS:  1. History of tachycardia    2.  Elevated LFTs               Jake Ortega DO  05/21/22 2031

## 2022-05-23 NOTE — PATIENT INSTRUCTIONS
Patient Education        DASH Diet: Care Instructions  Your Care Instructions     The DASH diet is an eating plan that can help lower your blood pressure. DASH stands for Dietary Approaches to Stop Hypertension. Hypertension is high bloodpressure. The DASH diet focuses on eating foods that are high in calcium, potassium, and magnesium. These nutrients can lower blood pressure. The foods that are highest in these nutrients are fruits, vegetables, low-fat dairy products, nuts, seeds, and legumes. But taking calcium, potassium, and magnesium supplements instead of eating foods that are high in those nutrients does not have the same effect. The DASH diet also includes whole grains, fish, and poultry. The DASH diet is one of several lifestyle changes your doctor may recommend to lower your high blood pressure. Your doctor may also want you to decrease the amount of sodium in your diet. Lowering sodium while following the DASH dietcan lower blood pressure even further than just the DASH diet alone. Follow-up care is a key part of your treatment and safety. Be sure to make and go to all appointments, and call your doctor if you are having problems. It's also a good idea to know your test results and keep alist of the medicines you take. How can you care for yourself at home? Following the DASH diet   Eat 4 to 5 servings of fruit each day. A serving is 1 medium-sized piece of fruit, ½ cup chopped or canned fruit, 1/4 cup dried fruit, or 4 ounces (½ cup) of fruit juice. Choose fruit more often than fruit juice.  Eat 4 to 5 servings of vegetables each day. A serving is 1 cup of lettuce or raw leafy vegetables, ½ cup of chopped or cooked vegetables, or 4 ounces (½ cup) of vegetable juice. Choose vegetables more often than vegetable juice.  Get 2 to 3 servings of low-fat and fat-free dairy each day. A serving is 8 ounces of milk, 1 cup of yogurt, or 1 ½ ounces of cheese.  Eat 6 to 8 servings of grains each day.  A serving is 1 slice of bread, 1 ounce of dry cereal, or ½ cup of cooked rice, pasta, or cooked cereal. Try to choose whole-grain products as much as possible.  Limit lean meat, poultry, and fish to 2 servings each day. A serving is 3 ounces, about the size of a deck of cards.  Eat 4 to 5 servings of nuts, seeds, and legumes (cooked dried beans, lentils, and split peas) each week. A serving is 1/3 cup of nuts, 2 tablespoons of seeds, or ½ cup of cooked beans or peas.  Limit fats and oils to 2 to 3 servings each day. A serving is 1 teaspoon of vegetable oil or 2 tablespoons of salad dressing.  Limit sweets and added sugars to 5 servings or less a week. A serving is 1 tablespoon jelly or jam, ½ cup sorbet, or 1 cup of lemonade.  Eat less than 2,300 milligrams (mg) of sodium a day. If you limit your sodium to 1,500 mg a day, you can lower your blood pressure even more.  Be aware that all of these are the suggested number of servings for people who eat 1,800 to 2,000 calories a day. Your recommended number of servings may be different if you need more or fewer calories. Tips for success   Start small. Do not try to make dramatic changes to your diet all at once. You might feel that you are missing out on your favorite foods and then be more likely to not follow the plan. Make small changes, and stick with them. Once those changes become habit, add a few more changes.  Try some of the following:  ? Make it a goal to eat a fruit or vegetable at every meal and at snacks. This will make it easy to get the recommended amount of fruits and vegetables each day. ? Try yogurt topped with fruit and nuts for a snack or healthy dessert. ? Add lettuce, tomato, cucumber, and onion to sandwiches. ? Combine a ready-made pizza crust with low-fat mozzarella cheese and lots of vegetable toppings. Try using tomatoes, squash, spinach, broccoli, carrots, cauliflower, and onions. ?  Have a variety of cut-up vegetables with a low-fat dip as an appetizer instead of chips and dip. ? Sprinkle sunflower seeds or chopped almonds over salads. Or try adding chopped walnuts or almonds to cooked vegetables. ? Try some vegetarian meals using beans and peas. Add garbanzo or kidney beans to salads. Make burritos and tacos with mashed sanon beans or black beans. Where can you learn more? Go to https://Elastra.Appian. org and sign in to your [employer] account. Enter L432 in the Abyz box to learn more about \"DASH Diet: Care Instructions. \"     If you do not have an account, please click on the \"Sign Up Now\" link. Current as of: January 10, 2022               Content Version: 13.2  © 1169-2604 [employer]. Care instructions adapted under license by [facility] ([facility]). If you have questions about a medical condition or this instruction, always ask your healthcare professional. [address] any warranty or liability for your use of this information.

## 2022-05-23 NOTE — PROGRESS NOTES
[address]      Cardiology Consult    Violette   1970    May 25, 2022    Referring Physician: Celi Alexander MD  Reason for Referral: abnormal stress test, nonobstructive CAD, SVT     CC: \"My heart was beating fast.\"     HPI:  The patient is 49 y.o. male with a past medical history significant for sleep apnea, obesity, and hypertension who presents today for management of CAD and SVT. He was initially seen for evaluation of an abnormal stress test. He reported an episode of lightheadedness with associated diaphoresis that occurred with walking in October. He denied any recurrence but stated he was concerned and felt he should be seen by his primary care physician. He completed a stress test 10/14/21 that suggested reversible ischemia. He denied any chest pains when completing his stress test but the ECG portion of the stress test was normal and had no inducible chest pain. Coronary CTA showed minimal nonobstructive CAD. Stress test defects likely artifactual. He sought treatment in the ED 5/21/22 for palpitations that he described as his heart \"racing. \" He felt quite unwell during the event with associated chest discomfort and dyspnea. EMS reported the patient was in SVT with a rate of 200 and converted to sinus with valsalva maneuver but the actual EKG is not available in office today. He denied associated lightheadedness or syncope. He states overall he is doing well and denies any known recurrence. He feels like this was the first event. He denies any new cardiac sounding complaints. He denies any exertional chest pains or worsening shortness of breath. He reports compliance with his medications and tolerating. He denies any abnormal bleeding or bruising. Patient denies exertional chest pain/pressure, dyspnea at rest, worsening HAN, PND, orthopnea, lightheadedness, weight changes, changes in LE edema, and syncope.     Past Medical History:   Diagnosis Date    Hyperlipidemia     Hypertension     Sleep apnea      Past Surgical History:   Procedure Laterality Date    ROTATOR CUFF REPAIR Right      Family History   Problem Relation Age of Onset    Other Other         nka family hx sleep apnea     Heart Attack Father     Hypertension Paternal Uncle      Social History     Tobacco Use    Smoking status: Former Smoker     Quit date: 3/18/1991     Years since quittin.2    Smokeless tobacco: Never Used   Vaping Use    Vaping Use: Never used   Substance Use Topics    Alcohol use: Yes     Comment: rarely    Drug use: Never     No Known Allergies  Current Outpatient Medications   Medication Sig Dispense Refill    atorvastatin (LIPITOR) 40 MG tablet Take 1 tablet by mouth daily 90 tablet 2    hydroCHLOROthiazide (HYDRODIURIL) 25 MG tablet Take 25 mg by mouth daily       lisinopril (PRINIVIL;ZESTRIL) 10 MG tablet Take 10 mg by mouth daily        No current facility-administered medications for this visit. Review of Systems:  · Constitutional: No unanticipated weight loss. There's been no change in energy level, sleep pattern, or activity level. No fevers, chills. · Eyes: No visual changes or diplopia. No scleral icterus. · ENT: No Headaches, hearing loss or vertigo. No mouth sores or sore throat. · Cardiovascular: as reviewed in HPI  · Respiratory: No cough or wheezing, no sputum production. No hemoptysis. · Gastrointestinal: No abdominal pain, appetite loss, blood in stools. No change in bowel or bladder habits. · Genitourinary: No dysuria, trouble voiding, or hematuria. · Musculoskeletal:  No gait disturbance, no joint complaints. · Integumentary: No rash or pruritis. · Neurological: No headache, diplopia, change in muscle strength, numbness or tingling. · Psychiatric: No anxiety or depression. · Endocrine: No temperature intolerance. No excessive thirst, fluid intake, or urination. No tremor.   · Hematologic/Lymphatic: No abnormal bruising or bleeding, blood clots or swollen lymph nodes. · Allergic/Immunologic: No nasal congestion or hives. Physical Exam:   /80   Pulse 77   Ht 5' 10\" (1.778 m)   Wt 294 lb (133.4 kg)   SpO2 98%   BMI 42.18 kg/m²   Wt Readings from Last 3 Encounters:   05/25/22 294 lb (133.4 kg)   05/21/22 (!) 325 lb 2.9 oz (147.5 kg)   04/18/22 (!) 324 lb 3.2 oz (147.1 kg)     Constitutional: He is an obese male who is oriented to person, place, and time. In no acute distress. Head: Normocephalic and atraumatic. Pupils equal and round. Neck: Neck supple. No JVP or carotid bruit appreciated. No mass and no thyromegaly present. No lymphadenopathy present. Cardiovascular: Normal rate. Normal heart sounds. Exam reveals no gallop and no friction rub. No murmur heard. Pulmonary/Chest: Effort normal and breath sounds normal. No respiratory distress. He has no wheezes, rhonchi or rales. Abdominal: Soft, non-tender. Bowel sounds are normal. He exhibits no organomegaly, mass or bruit. Extremities: No edema. No cyanosis or clubbing. Pulses are 2+ radial/carotid bilaterally. Neurological: No gross cranial nerve deficit. Coordination normal.   Skin: Skin is warm and dry. There is no rash or diaphoresis. Psychiatric: He has a normal mood and affect. His speech is normal and behavior is normal.     Lab Review:   FLP:  No results found for: TRIG, HDL, LDLCALC, LDLDIRECT, LABVLDL  BUN/Creatinine:    Lab Results   Component Value Date    BUN 15 05/21/2022    CREATININE 0.6 05/21/2022     EKG Interpretation: 12/21/21Sinus rhythm. Low voltage in precordial leads. Cannot rule out old inferior infarct. 5/25/22 Sinus rhythm. Image Review:     Stress test 10/14/21 Personally reviewed. Normal treadmill exercise stress portion of the study. Moderate size multiple reversible perfusion defects involving the apex,  anterior and inferior walls suggestive of ischemia  Gated Study shows normal LV size and Systolic function; EF is 88%.   The patient exercised for 6 min. on the Max protocol to achieve a HR of  150, which is 88% of PMHR. The study was stopped due to shortness of breath  & exhaustion. There was no chest pain or ischemic ST changes. Coronary CTA 1/11/22  Calcium score measures 14. Coronary artery origins appear normal.  Scattered  plaque-like luminal irregularities are seen, with no flow limiting coronary  artery stenosis noted with the reservation that phase misregistration  artifact mildly limits the study. Assessment/Plan:   1) PSVT. Documented by EMS but actual EKG tracing is not available today. Converted with valsalva maneuver per ED report. Symptomatic with episode but denies any known recurrence. Discussed treatment options, possible ablation, and a referral to electrophysiology. Patient is agreeable and will refer to EP to discuss ablation. Will arrange for an echocardiogram. Educated on proper valsalva maneuvers with any recurrence. Will attempt to obtain EMS reports along with the EKG. 2) Nonobstructive CAD/Abnormal stress test. Asymptomatic. Images personally reviewed. Multiple defects on the stress test but does not correlate with clinical findings/lack of exertional chest pain. Patient did not have attenuation correction on the stress test. He denies typical/exertional chest pain. Coronary CTA showed minimal nonobstructive CAD. No indication for angiography. Stress test defects likely artifactual. Continue Lipitor 40 mg daily as there is a mild degree of CAD.      3) Essential hypertension. Controlled. Goal BP <130/80. Continue medical therapy. 4) Morbid obesity. BMI 42. Encouraged weight loss and increase aerobic exercise as tolerated. 5) THERESA. Encouraged compliance and to follow up with sleep medicine. 6) Hyperglycemia/Elevated LFTs. Will defer management to the PCP. Follow up with EP     Thank you very much for allowing me to participate in the care of your patient.  Please do not hesitate to contact me if you have any questions. Sincerely,  Avinash Zuniga. Dave Bond, [address] [facility], [address] [place] [address] Matt Christie  Ph: [phone]  Fax: [phone]    This note was scribed in the presence of Dr Dave Bond MD by Darlin Mortensen RN. Physician Attestation: The scribes documentation has been prepared under my direction and personally reviewed by me in its entirety. I confirm that the note above accurately reflects all work, treatment, procedures, and medical decision making performed by me. All portions of the note including but not limited to the chief complaint, history of present illness, physical exam, assessment and plan/medical decision making were personally reviewed, edited, and updated on the day of the visit.

## 2022-05-25 ENCOUNTER — TELEPHONE (OUTPATIENT)
Dept: CARDIOLOGY CLINIC | Age: 52
End: 2022-05-25

## 2022-05-25 ENCOUNTER — OFFICE VISIT (OUTPATIENT)
Dept: CARDIOLOGY CLINIC | Age: 52
End: 2022-05-25
Payer: COMMERCIAL

## 2022-05-25 VITALS
WEIGHT: 294 LBS | DIASTOLIC BLOOD PRESSURE: 80 MMHG | HEART RATE: 77 BPM | BODY MASS INDEX: 42.09 KG/M2 | HEIGHT: 70 IN | SYSTOLIC BLOOD PRESSURE: 132 MMHG | OXYGEN SATURATION: 98 %

## 2022-05-25 DIAGNOSIS — I25.10 CORONARY ARTERY DISEASE INVOLVING NATIVE CORONARY ARTERY OF NATIVE HEART WITHOUT ANGINA PECTORIS: ICD-10-CM

## 2022-05-25 DIAGNOSIS — I10 ESSENTIAL HYPERTENSION: ICD-10-CM

## 2022-05-25 DIAGNOSIS — E66.01 MORBID OBESITY (HCC): ICD-10-CM

## 2022-05-25 DIAGNOSIS — G47.33 OSA (OBSTRUCTIVE SLEEP APNEA): ICD-10-CM

## 2022-05-25 DIAGNOSIS — I47.1 PSVT (PAROXYSMAL SUPRAVENTRICULAR TACHYCARDIA) (HCC): Primary | ICD-10-CM

## 2022-05-25 PROCEDURE — G8417 CALC BMI ABV UP PARAM F/U: HCPCS | Performed by: INTERNAL MEDICINE

## 2022-05-25 PROCEDURE — 1036F TOBACCO NON-USER: CPT | Performed by: INTERNAL MEDICINE

## 2022-05-25 PROCEDURE — 93000 ELECTROCARDIOGRAM COMPLETE: CPT | Performed by: INTERNAL MEDICINE

## 2022-05-25 PROCEDURE — G8427 DOCREV CUR MEDS BY ELIG CLIN: HCPCS | Performed by: INTERNAL MEDICINE

## 2022-05-25 PROCEDURE — 3017F COLORECTAL CA SCREEN DOC REV: CPT | Performed by: INTERNAL MEDICINE

## 2022-05-25 PROCEDURE — 99214 OFFICE O/P EST MOD 30 MIN: CPT | Performed by: INTERNAL MEDICINE

## 2022-05-25 NOTE — TELEPHONE ENCOUNTER
Contacted EMS and gave them pt's info, they are transferring info to the supervisor and will contact us back if they have readings they can provide us.

## 2022-05-25 NOTE — TELEPHONE ENCOUNTER
Patient seen in office today for SVT. ED visit 5/21/22 with reports of SVT documented per EMS. Please obtain the ECG tracings from EMS ([facility]) along with any EMS reports that are available on 5/21/22.

## 2022-05-26 NOTE — PROGRESS NOTES
Cardiac Electrophysiology Consultation   Date: 5/27/2022   Reason for Consultation: SVT  Consult Requesting Physician: Vitaliy Lopez MD  Primary Care Physician: Prashant Gamble MD    Chief Complaint:   Chief Complaint   Patient presents with    New Patient     psvt ; discuss ablation ; no cardiac complaints at this time         HPI: Yvonne Staples is a 49 y.o. patient with a history of HLD, HTN, sleep apnea. He presented to [facility] on 5/21/2022 via EMS after he began experiencing symptoms of palpitations and heart racing at home. EMS had him perform vagal maneuvers and he converted to NSR. We have requested tracing for EKG completed by EMS as well as note from the transport. Today, he presents to office referred by his primary cardiologist Dr. Prince Springer for evaluation of SVT. He recalls the story that brought him the the ED. He states he was coloring his wife's hair and when he got done, his heart starting pounding. He states that was the first episode he experienced. He took an ASA and called 911 and was found to have a HR of 200. His wife got their neighbor who has SVT hx and ablation who had the patient perform vagal maneuvers. It terminated the fast rhythm while enroute to the ED. He denies chest pain/pressure, tightness, edema, shortness of breath, palpitations, lightheadedness, dizziness, syncope, presyncope,  PND or orthopnea. Past Medical History:   Diagnosis Date    Hyperlipidemia     Hypertension     Sleep apnea       Past Surgical History:   Procedure Laterality Date    ROTATOR CUFF REPAIR Right        Allergies:  No Known Allergies    Medication:   Prior to Admission medications    Medication Sig Start Date End Date Taking?  Authorizing Provider   atorvastatin (LIPITOR) 40 MG tablet Take 1 tablet by mouth daily 1/13/22  Yes Vitaliy Lopez MD   hydroCHLOROthiazide (HYDRODIURIL) 25 MG tablet Take 25 mg by mouth daily  12/16/21  Yes Historical Provider, MD   lisinopril (PRINIVIL;ZESTRIL) 10 MG tablet Take 10 mg by mouth daily  12/11/21  Yes Historical Provider, MD       Social History:   reports that he quit smoking about 31 years ago. He has never used smokeless tobacco. He reports current alcohol use. He reports that he does not use drugs. Family History:  family history includes Heart Attack in his father; Hypertension in his paternal uncle; Other in an other family member. Reviewed. Denies family history of sudden cardiac death, arrhythmia, premature CAD    Review of System:  Pertinent positive and negatives are in the HPI, the rest are negative. Physical Examination:  /72   Pulse 80   Ht 5' 10\" (1.778 m)   Wt (!) 314 lb 6.4 oz (142.6 kg)   SpO2 98%   BMI 45.11 kg/m²      · Constitutional: Oriented. No distress. · Head: Normocephalic and atraumatic. · Mouth/Throat: Oropharynx is clear and moist.   · Eyes: Conjunctivae normal. EOM are normal.   · Neck: Normal range of motion. Neck supple. No rigidity. No JVD present. · Cardiovascular: Normal rate, regular rhythm, S1&S2 and intact distal pulses. · Pulmonary/Chest: Bilateral respiratory sounds. No wheezes. No rhonchi. · Abdominal: Soft. Bowel sounds present. No distension, No tenderness. · Musculoskeletal: No tenderness. No edema    · Lymphadenopathy: Has no cervical adenopathy. · Neurological: Alert and oriented. Cranial nerve appears intact, No Gross deficit   · Skin: Skin is warm and dry. No rash noted. · Psychiatric: Has a normal mood, affect and behavior     Labs:  Reviewed. ECG: reviewed, NSR with normal intervals. Studies:   1. Event monitor: none      2. Echo:   Scheduled for 6/17/2022    3. Stress Test:  10/14/2021   Normal treadmill exercise stress portion of the study. Moderate size multiple reversible perfusion defects involving the apex,  anterior and inferior walls suggestive of ischemia  Gated Study shows normal LV size and Systolic function; EF is 78%.   The patient exercised for 6 min. on the Max protocol to achieve a HR of  150, which is 88% of PMHR. The study was stopped due to shortness of breath& exhaustion. There was no chest pain or ischemic ST changes. 4. Cath:   None    5. Coronary CTA 1/11/2022  Calcium score measures 14.  Coronary artery origins appear normal.  Scattered  plaque-like luminal irregularities are seen, with no flow limiting coronary  artery stenosis noted with the reservation that phase misregistration  artifact mildly limits the study. I independently reviewed the ECG, MCOT, echocardiogram, stress test, and coronary CTA results and used them for my plan of care. Assessment/Plan:     Paroxsymal supraventricular tachycardia   -Documented by EMS but actual EKG tracing is not available today. -Requested records and EKG tracing from [employer]. -Converted with valsalva maneuver per ED report. We discussed treatment options for SVT including use of medications and undergoing EP study and SVT ablation. SVT ablation is a curative therapy with a very high success rate and would afford the patient the complete eradication of the PSVT. Mr. China Liz has symptoms consistent with SVT, terminated by vagal maneuvers. Most likely AVNRT but AT/concealed retro pathway tach in differential. No preexcitation on ECG. Conservative management with vagal maneuvers. Prescribed beta blockers if conservative measures do not terminate tachycardia as he goes to ED. If it recurs and not responsive to above measures, he strongly prefers to then undergo ablation.      -Discussed self termination techniques to use for reoccurrence  of SVT. Bearing down (valsalva maneuver), cold towel from ICE water on face. Nonobstructive CAD   -Denies angina.   -Coronary CTA showed minimal nonobstructive CAD. -Continue with medical management and risk factor modification including statin. Essential hypertension   -Goal BP <130/80.  Continue medical therapy. Morbid obesity   -Body mass index is 45.11 kg/m². -Encouraged weight loss and increase aerobic exercise as tolerated. THERESA   -Encouraged to follow up with sleep medicine. Follow ups:  - Follow up with EP PRN. -Continue routine follow up with Dr. Amelie Mortensen MD.      Thank you for allowing me to participate in the care of Ginny Smith. All questions and concerns were addressed to the patient/family. Alternatives to my treatment were discussed. This note was scribed in the presence of Víctor Vaughan MD by Cody Baptiste RN. Physician attestation: The scribe's documentation has been prepared under my direction and has been personally reviewed by me in its entirety. I confirm that the note above reflects all work, treatment, procedures, and medical decision making performed by me.      Víctor Vaughan MD  Cardiac Electrophysiology  [address]

## 2022-05-27 ENCOUNTER — OFFICE VISIT (OUTPATIENT)
Dept: CARDIOLOGY CLINIC | Age: 52
End: 2022-05-27
Payer: COMMERCIAL

## 2022-05-27 VITALS
HEIGHT: 70 IN | SYSTOLIC BLOOD PRESSURE: 124 MMHG | HEART RATE: 80 BPM | WEIGHT: 314.4 LBS | OXYGEN SATURATION: 98 % | DIASTOLIC BLOOD PRESSURE: 72 MMHG | BODY MASS INDEX: 45.01 KG/M2

## 2022-05-27 DIAGNOSIS — I25.10 CORONARY ARTERY DISEASE INVOLVING NATIVE CORONARY ARTERY OF NATIVE HEART WITHOUT ANGINA PECTORIS: ICD-10-CM

## 2022-05-27 DIAGNOSIS — E66.01 MORBID OBESITY (HCC): ICD-10-CM

## 2022-05-27 DIAGNOSIS — G47.33 OSA (OBSTRUCTIVE SLEEP APNEA): Primary | ICD-10-CM

## 2022-05-27 DIAGNOSIS — I10 ESSENTIAL HYPERTENSION: ICD-10-CM

## 2022-05-27 DIAGNOSIS — I47.1 PSVT (PAROXYSMAL SUPRAVENTRICULAR TACHYCARDIA) (HCC): ICD-10-CM

## 2022-05-27 PROCEDURE — 1036F TOBACCO NON-USER: CPT | Performed by: INTERNAL MEDICINE

## 2022-05-27 PROCEDURE — G8427 DOCREV CUR MEDS BY ELIG CLIN: HCPCS | Performed by: INTERNAL MEDICINE

## 2022-05-27 PROCEDURE — 99204 OFFICE O/P NEW MOD 45 MIN: CPT | Performed by: INTERNAL MEDICINE

## 2022-05-27 PROCEDURE — G8417 CALC BMI ABV UP PARAM F/U: HCPCS | Performed by: INTERNAL MEDICINE

## 2022-05-27 PROCEDURE — 3017F COLORECTAL CA SCREEN DOC REV: CPT | Performed by: INTERNAL MEDICINE

## 2022-05-27 PROCEDURE — 93000 ELECTROCARDIOGRAM COMPLETE: CPT | Performed by: INTERNAL MEDICINE

## 2022-05-27 NOTE — PATIENT INSTRUCTIONS
If you have any question regarding your ablation please contact Dr. Emerita Muñoz at [phone]. If you would like to proceed with scheduling  your ablation please contact  Toni Luis at  [phone]    EP Study /Supraventricular Tachycardia Ablation Pre procedure Instructions    Date:______________________________    [employer] at:__________________________    Procedure time:____________________    The morning of your procedure you will park in the hospital parking lot and report directly to the cath lab to check in. At the information desk stay right and go all the way to the end of the rincon, this will take you directly to your check in desk for the cath lab. Pre-Procedure Instructions   1. You will need to fast (nothing to eat or drink) after midnight the day of your procedure  2. Do NOT chew gum or eat mints the day of your procedure. 3. Do not take Metoprolol for three days prior to the procedure. 4. You may take your Lisinopril & hydroCHLOROthiazide the morning of the procedure with sips of water. 5. Do not use any lotions, creams or perfume the morning of procedure. 6. You will need to complete pre-procedure lab work 5-7 days prior to your procedure. 7. Please have a responsible adult to drive you home after procedure, you should go home same day, but there is always a possibility of an overnight stay. 8. Cath lab will provide you with all post procedure instructions                                          [address] [place] [address]/[facility]  [address] [place] [address]. [address] [place] [address]  Phone: [phone]  The hours are Mon -Fri.   6:30 am  4:00 pm   Saturday 8:00 am  noon  No appointment necessary                Patient Education        Supraventricular Tachycardia: Care Instructions  Overview     Having supraventricular tachycardia (SVT) means that sometimes your heart beats abnormally fast. This fast rhythm is caused by changes in the electrical system of your heart. You may feel a fluttering in your chest (palpitations) and have a fast pulse. When your heart is beating fast, you may feel anxious andlightheaded, be short of breath, and feel discomfort in the chest.  Your doctor may prescribe medicines to help slow down your heartbeat. Your doctor may also suggest you try vagal maneuvers to help slow your heart rate. Your doctor can show you how to do them. In some cases, either cardioversion treatment or a procedure called catheterablation is done to correct SVT. Your doctor may ask you to wear a small electronic device for 1 or 2 days tomonitor your heart. It is called a Holter monitor. Follow-up care is a key part of your treatment and safety. Be sure to make and go to all appointments, and call your doctor if you are having problems. It's also a good idea to know your test results and keep alist of the medicines you take. How can you care for yourself at home?  Be safe with medicines. Take your medicines exactly as prescribed. Call your doctor if you think you are having a problem with your medicine. You will get more details on the specific medicines your doctor prescribes.  If your doctor showed you how to do vagal maneuvers, try them when you have an episode. These maneuvers include bearing down or putting an ice-cold, wet towel on your face.  Monitor your condition by keeping a diary of your SVT episodes. Bring this to your doctor appointments. ? Write down how fast or slow your heart was beating. To count your heart rate:  - Gently place 2 fingers of your hand on the inside of your other wrist, below your thumb. - Count the beats for 30 seconds. - Then, double the result to get the number of beats per minute. ? Write down if your heart rhythm was regular or irregular. ? Write down the symptoms you had.  ? Write down the time of day your symptoms occurred. ? Write down how long your symptoms lasted.   ? Write down what you were doing when your symptoms started. ? Write down what may have helped your symptoms go away.  If they trigger episodes, limit or avoid alcohol or drinks with caffeine.  Do not use over-the-counter decongestants, herbal remedies, diet pills, or \"pep\" pills, which often contain stimulants.  Do not use illegal drugs, such as cocaine, ecstasy, or methamphetamine, which can speed up your heart's rhythm.  Do not smoke. Smoking can make this condition worse. If you need help quitting, talk to your doctor about stop-smoking programs and medicines. These can increase your chances of quitting for good.  Be alert for new or worsening symptoms, such as shortness of breath, pounding of your heart, or unusual tiredness. If new symptoms develop or your symptoms become worse, call your doctor. When should you call for help? Call 911 anytime you think you may need emergency care. For example, call if:     You passed out (lost consciousness).      You are short of breath. Call your doctor now or seek immediate medical care if:     You have a fast heartbeat.      You are dizzy or lightheaded, or feel like you may faint. Watch closely for changes in your health, and be sure to contact your doctor if:     You do not get better as expected. Where can you learn more? Go to https://Dlyte.com.UCloud Information Technology. org and sign in to your ContactPoint account. Enter G244 in the [facility] box to learn more about \"Supraventricular Tachycardia: Care Instructions. \"     If you do not have an account, please click on the \"Sign Up Now\" link. Current as of: January 10, 2022               Content Version: 13.2  © 4801-1149 Healthwise, Incorporated. Care instructions adapted under license by [facility] ([facility]).  If you have questions about a medical condition or this instruction, always ask your healthcare professional. [address] any warranty or liability for your use of this information. Patient Education        Electrophysiology (EP) Study: Before Your Procedure  What is an EP study? An electrophysiology (EP) study is a test to see if there is a problem with your heartbeat (heart rhythm). This test can also find out how to fix the problem. A procedure called catheter ablation is sometimes done at the sametime to try to correct the problem. The doctor puts thin tubes called catheters into blood vessels in your groin, arm, or neck. The tubes are guided to your heart. There is an electrode at thetip of each tube. The electrode helps the doctor find the problem areas. If a problem can be fixed with ablation, then the doctor uses the electrode to send energy to destroy (ablate) the areas of heart tissue that are causing the problem. The areas that are destroyed are very tiny. They should not affect theheart's ability to do its job. You may get medicine that relaxes you or puts you in a light sleep. Or you might be asleep during the procedure. The places where the catheters go in willbe numb. If you have an EP study only and you don't need more treatment, you may go homethe same day. If you also have ablation, you may stay overnight in the hospital.  How do you prepare for the procedure? Procedures can be stressful. This information will help you understand what youcan expect. And it will help you safely prepare for your procedure. Preparing for the procedure     Be sure you have someone to take you home. Anesthesia and pain medicine will make it unsafe for you to drive or get home on your own.      Understand exactly what procedure is planned, along with the risks, benefits, and other options.      Tell your doctor ALL the medicines, vitamins, supplements, and herbal remedies you take. Some may increase the risk of problems during your procedure.  Your doctor will tell you if you should stop taking any of them before the procedure and how soon to do it.      If you take aspirin or some other blood thinner, ask your doctor if you should stop taking it before your procedure. Make sure that you understand exactly what your doctor wants you to do. These medicines increase the risk of bleeding.      Make sure your doctor and the hospital have a copy of your advance directive. If you don't have one, you may want to prepare one. It lets others know your health care wishes. It's a good thing to have before any type of surgery or procedure. What happens on the day of the procedure?     Follow the instructions exactly about when to stop eating and drinking. If you don't, your surgery may be canceled. If your doctor told you to take your medicines on the day of surgery, take them with only a sip of water.      Follow your doctor's instructions about when to bathe or shower before your surgery. Do not apply lotions, perfumes, deodorants, or nail polish.      Take off all jewelry and piercings. And take out contact lenses, if you wear them. At the hospital or surgery center     Bring a picture ID.      You will be kept comfortable and safe by your anesthesia provider. The anesthesia may make you sleep. Or it may just numb the area being worked on.      This procedure can take 2 to 6 hours. In rare cases, it can take longer.      After the procedure, pressure may be applied to the areas where a catheter was put in a blood vessel. This will help prevent bleeding. A small device may also be used to close the blood vessel. You may have a bandage or a compression device on each catheter site.      Nurses will check your heart rate and blood pressure. The nurse will also check the catheter site for bleeding.      If the catheter was put in your groin, you will need to lie still and keep your leg straight for up to a few hours. The nurse may put a weighted bag on your leg to help you keep it still.      If the catheter was put in your neck or arm, you may be able to sit up right away. If it was in your arm, you will need to keep your arm still for at least 1 hour.      You may have a bruise or a small lump where the catheter was put in your blood vessel. This is normal and will go away. When should you call your doctor?  You have questions or concerns.      You don't understand how to prepare for your procedure.      You become ill before the procedure (such as fever, flu, or a cold).      You need to reschedule or have changed your mind about having the procedure. Where can you learn more? Go to https://StripepepicewRedfin Network.Resolver. org and sign in to your [employer] account. Enter Q394 in the [employer] box to learn more about \"Electrophysiology (EP) Study: Before Your Procedure. \"     If you do not have an account, please click on the \"Sign Up Now\" link. Current as of: January 10, 2022               Content Version: 13.2  © 2006-2022 [employer]. Care instructions adapted under license by [facility] ([facility]). If you have questions about a medical condition or this instruction, always ask your healthcare professional. [place] [address] any warranty or liability for your use of this information. Patient Education        Electrophysiology (EP) Study: What to Expect at [address] [place] [address] Drive had an electrophysiology study for a problem with your heartbeat (heart rhythm). You may also have had a procedure called a catheter ablation to try to correct the problem. You may have swelling, bruising, or a small lump around the sites where the catheters went into your body. These should go away in 3 to4 weeks. You can do light activities at home. Don't do anything strenuous until yourdoctor says it is okay. This may be for several days. This care sheet gives you a general idea about how long it will take for you to recover. But each person recovers at a different pace. Follow the steps belowto get better as quickly as possible.   How can you care for yourself at home? Activity     If the doctor gave you a sedative:  ? For 24 hours, don't do anything that requires attention to detail, such as going to work, making important decisions, or signing any legal documents. It takes time for the medicine's effects to completely wear off.  ? For your safety, do not drive or operate any machinery that could be dangerous. Wait until the medicine wears off and you can think clearly and react easily.      Do not do strenuous exercise and do not lift, pull, or push anything heavy until your doctor says it is okay. This may be for several days.      Ask your doctor when it is okay to have sex. Diet     You can eat your normal diet. If your stomach is upset, try bland, low-fat foods like plain rice, broiled chicken, toast, and yogurt.      Drink plenty of fluids (unless your doctor tells you not to). Medicines     Your doctor will tell you if and when you can restart your medicines. You will also get instructions about taking any new medicines.      If you take aspirin or some other blood thinner, ask your doctor if and when to start taking it again. Make sure that you understand exactly what your doctor wants you to do.      Be safe with medicines. Take your medicines exactly as prescribed. Call your doctor if you think you are having a problem with your medicine. Catheter site care     For 1 or 2 days, keep a bandage over the spot where the catheter was inserted. The bandage probably will fall off in this time.      Put ice or a cold pack on the area for 10 to 20 minutes at a time to help with soreness or swelling. Put a thin cloth between the ice and your skin.      You may shower 24 to 48 hours after the procedure, if your doctor okays it. Pat the incision dry.      Do not soak the catheter site until it is healed. Don't take a bath for 1 week, or until your doctor tells you it is okay.      Watch for bleeding from the site.  A small amount of blood (up to the size of a quarter) on the bandage can be normal.      If you are bleeding, lie down and press on the area for 15 minutes to try to make it stop. If the bleeding does not stop, call your doctor or seek immediate medical care. Follow-up care is a key part of your treatment and safety. Be sure to make and go to all appointments, and call your doctor if you are having problems. It's also a good idea to know your test results and keep alist of the medicines you take. When should you call for help? Call 911  anytime you think you may need emergency care. For example, call if:     You passed out (lost consciousness).      You have symptoms of a heart attack. These may include:  ? Chest pain or pressure, or a strange feeling in the chest.  ? Sweating. ? Shortness of breath. ? Nausea or vomiting. ? Pain, pressure, or a strange feeling in the back, neck, jaw, or upper belly, or in one or both shoulders or arms. ? Lightheadedness or sudden weakness. ? A fast or irregular heartbeat. After you call 911, the  may tell you to chew 1 adult-strength or 2 to 4 low-dose aspirin. Wait for an ambulance. Do not try to drive yourself.      You have symptoms of a stroke. These may include:  ? Sudden numbness, tingling, weakness, or loss of movement in your face, arm, or leg, especially on only one side of your body. ? Sudden vision changes. ? Sudden trouble speaking. ? Sudden confusion or trouble understanding simple statements. ? Sudden problems with walking or balance. ? A sudden, severe headache that is different from past headaches. Call your doctor now or seek immediate medical care if:     You are bleeding from the area where the catheter was put in your blood vessel.      You have a fast-growing, painful lump at the catheter site.      You have signs of infection, such as:  ? Increased pain, swelling, warmth, or redness. ? Red streaks leading from the catheter site.   ? Pus draining from the catheter site. ? A fever.      Your leg, arm, or hand is painful, looks blue, or feels cold, numb, or tingly. Watch closely for any changes in your health, and be sure to contact yourdoctor if you have any problems. Where can you learn more? Go to https://chpepiceweb.Albeo [email or web address] org and sign in to your [email or web address] account. Enter [phone] in the [employer] box to learn more about \"Electrophysiology (EP) Study: What to Expect at Home. \"     If you do not have an account, please click on the \"Sign Up Now\" link. Current as of: January 10, 2022               Content Version: 13.2  © 2006-2022 [employer]. Care instructions adapted under license by [facility] ([facility]). If you have questions about a medical condition or this instruction, always ask your healthcare professional. [place] [address] any warranty or liability for your use of this information. Patient Education        Learning About Catheter Ablation for Heart Rhythm Problems  What is catheter ablation? Catheter ablation is a procedure that treats heart rhythm problems. These problems include atrial fibrillation, supraventricular tachycardia (SVT),atrial flutter, and ventricular tachycardia. Your heart should have a strong, steady beat. That beat is controlled by the heart's electrical system. Sometimes that system misfires. This causes aheartbeat that is too fast and isn't steady. Catheter ablation is a way to get into your heart and fix the problem. Ablationis not surgery. How is catheter ablation done? Your doctor inserts thin tubes called catheters into a blood vessel in your groin, arm, or neck. Then your doctor feeds them into the heart. Wires in the catheters help the doctor find the problem areas. Then the doctor uses the wires to send energy to destroy the tiny areas of heart tissue that are causingthe problems. It may seem like a bad idea to destroy parts of your heart on purpose.  But the areas that are destroyed are very tiny. They should not affect your heart'sability to do its job. You may be awake during the procedure. Or you may be asleep. The doctor will give you medicines to help you feel relaxed and to numb the areas where the catheters go in. You may feel a little uncomfortable, but you should not feelpain. What can you expect after catheter ablation? You may stay overnight in the hospital. How long you stay in the hospitaldepends on the type of ablation you have. Do not exercise hard or lift anything heavy for a week. You will probably beable to go back to work and to your normal routine in 1 or 2 days. You may have swelling, bruising, or a small lump around the site where thecatheters went into your body. These should go away in 3 to 4 weeks. You may have to take some medicines for a while. Follow-up care is a key part of your treatment and safety. Be sure to make and go to all appointments, and call your doctor if you are having problems. It's also a good idea to know your test results and keep alist of the medicines you take. Where can you learn more? Go to https://Elasticsearch.HealthMedia. org and sign in to your [employer] account. Enter C290 in the [facility] box to learn more about \"Learning About Catheter Ablation for Heart Rhythm Problems. \"     If you do not have an account, please click on the \"Sign Up Now\" link. Current as of: January 10, 2022               Content Version: 13.2  © 2006-2022 Healthwise, Incorporated. Care instructions adapted under license by [facility] ([facility]). If you have questions about a medical condition or this instruction, always ask your healthcare professional. [place] [address] any warranty or liability for your use of this information.

## 2022-05-31 NOTE — TELEPHONE ENCOUNTER
Patient was seen by Dr. Kenia Martinez, will continue with conservative management. Patient will call with any recurrence.

## 2022-06-17 ENCOUNTER — HOSPITAL ENCOUNTER (OUTPATIENT)
Dept: NON INVASIVE DIAGNOSTICS | Age: 52
Discharge: HOME | End: 2022-06-17
Payer: COMMERCIAL

## 2022-06-17 DIAGNOSIS — I47.1 PSVT (PAROXYSMAL SUPRAVENTRICULAR TACHYCARDIA) (HCC): ICD-10-CM

## 2022-06-17 PROCEDURE — 6360000004 HC RX CONTRAST MEDICATION: Performed by: INTERNAL MEDICINE

## 2022-06-17 PROCEDURE — C8929 TTE W OR WO FOL WCON,DOPPLER: HCPCS

## 2022-06-17 RX ADMIN — PERFLUTREN 1.65 MG: 6.52 INJECTION, SUSPENSION INTRAVENOUS at 15:39

## 2022-11-10 RX ORDER — ATORVASTATIN CALCIUM 40 MG/1
TABLET, FILM COATED ORAL
Qty: 90 TABLET | Refills: 2 | Status: SHIPPED | OUTPATIENT
Start: 2022-11-10

## 2023-01-25 ENCOUNTER — OFFICE VISIT (OUTPATIENT)
Dept: CARDIOLOGY CLINIC | Age: 53
End: 2023-01-25
Payer: COMMERCIAL

## 2023-01-25 VITALS
WEIGHT: 310 LBS | HEART RATE: 84 BPM | DIASTOLIC BLOOD PRESSURE: 68 MMHG | BODY MASS INDEX: 44.38 KG/M2 | SYSTOLIC BLOOD PRESSURE: 122 MMHG | OXYGEN SATURATION: 96 % | HEIGHT: 70 IN

## 2023-01-25 DIAGNOSIS — I10 ESSENTIAL HYPERTENSION: ICD-10-CM

## 2023-01-25 DIAGNOSIS — E66.01 MORBID OBESITY (HCC): ICD-10-CM

## 2023-01-25 DIAGNOSIS — I47.1 PSVT (PAROXYSMAL SUPRAVENTRICULAR TACHYCARDIA) (HCC): Primary | ICD-10-CM

## 2023-01-25 DIAGNOSIS — I25.10 CORONARY ARTERY DISEASE INVOLVING NATIVE CORONARY ARTERY OF NATIVE HEART WITHOUT ANGINA PECTORIS: ICD-10-CM

## 2023-01-25 DIAGNOSIS — G47.33 OSA (OBSTRUCTIVE SLEEP APNEA): ICD-10-CM

## 2023-01-25 PROCEDURE — G8484 FLU IMMUNIZE NO ADMIN: HCPCS | Performed by: INTERNAL MEDICINE

## 2023-01-25 PROCEDURE — G8417 CALC BMI ABV UP PARAM F/U: HCPCS | Performed by: INTERNAL MEDICINE

## 2023-01-25 PROCEDURE — 99214 OFFICE O/P EST MOD 30 MIN: CPT | Performed by: INTERNAL MEDICINE

## 2023-01-25 PROCEDURE — 3078F DIAST BP <80 MM HG: CPT | Performed by: INTERNAL MEDICINE

## 2023-01-25 PROCEDURE — 3017F COLORECTAL CA SCREEN DOC REV: CPT | Performed by: INTERNAL MEDICINE

## 2023-01-25 PROCEDURE — 1036F TOBACCO NON-USER: CPT | Performed by: INTERNAL MEDICINE

## 2023-01-25 PROCEDURE — 93000 ELECTROCARDIOGRAM COMPLETE: CPT | Performed by: INTERNAL MEDICINE

## 2023-01-25 PROCEDURE — G8427 DOCREV CUR MEDS BY ELIG CLIN: HCPCS | Performed by: INTERNAL MEDICINE

## 2023-01-25 PROCEDURE — 3074F SYST BP LT 130 MM HG: CPT | Performed by: INTERNAL MEDICINE

## 2023-01-25 NOTE — PROGRESS NOTES
[address] [facility]  1970    January 25, 2023    Referring Physician: Hilda Hernandez MD  Reason for Referral: abnormal stress test, nonobstructive CAD, PSVT     CC: \"Yesterday my heart was racing and I was short of breath. \"     HPI:  The patient is 47 y.o. male with a past medical history significant for sleep apnea, obesity, and hypertension who presents today for management of CAD and SVT. He was initially seen for evaluation of an abnormal stress test. He reported an episode of lightheadedness with associated diaphoresis that occurred with walking in October. He denied any recurrence but stated he was concerned and felt he should be seen by his primary care physician. He completed a stress test 10/14/21 that suggested reversible ischemia. He denied any chest pains when completing his stress test but the ECG portion of the stress test was normal and had no inducible chest pain. Coronary CTA showed minimal nonobstructive CAD. Stress test defects likely artifactual. He sought treatment in the ED 5/21/22 for palpitations that he described as his heart \"racing. \" He felt quite unwell during the event with associated chest discomfort and dyspnea. EMS reported the patient was in SVT with a rate of 200 and converted to sinus with valsalva maneuver but the actual EKG is not available in office. He was referred to Dr. Daija Faust and an ablation was discussed but patient preferred conservative measures. Today, he is accompanied by his wife. He reports a recurrent episode of the SVT yesterday. He reports palpitations that he described as his heart racing, with associated lightheadedness and shortness of breath. His symptoms resolved with valsalva maneuver but reports feeling fatigued and weak the rest of the day. He reports intermittent episodes of dizziness over the past year but denies any syncope. He denies any exertional chest pains or worsening shortness of breath.  He reports compliance with his medications and tolerating. He denies any abnormal bleeding or bruising. Patient denies exertional chest pain/pressure, dyspnea at rest, worsening HAN, PND, orthopnea, weight changes, changes in LE edema, and syncope. Past Medical History:   Diagnosis Date    Hyperlipidemia     Hypertension     Sleep apnea      Past Surgical History:   Procedure Laterality Date    ROTATOR CUFF REPAIR Right      Family History   Problem Relation Age of Onset    Other Other         nka family hx sleep apnea     Heart Attack Father     Hypertension Paternal Uncle      Social History     Tobacco Use    Smoking status: Former     Types: Cigarettes     Quit date: 3/18/1991     Years since quittin.8    Smokeless tobacco: Never   Vaping Use    Vaping Use: Never used   Substance Use Topics    Alcohol use: Yes     Comment: rarely    Drug use: Never     No Known Allergies  Current Outpatient Medications   Medication Sig Dispense Refill    atorvastatin (LIPITOR) 40 MG tablet TAKE ONE TABLET BY MOUTH DAILY 90 tablet 2    hydroCHLOROthiazide (HYDRODIURIL) 25 MG tablet Take 25 mg by mouth daily       lisinopril (PRINIVIL;ZESTRIL) 10 MG tablet Take 10 mg by mouth daily       metoprolol tartrate (LOPRESSOR) 25 MG tablet Take 1 tablet by mouth daily as needed (for heart racing SVT) (Patient not taking: Reported on 2023) 30 tablet 5     No current facility-administered medications for this visit. Review of Systems:  Constitutional: No unanticipated weight loss. There's been no change in energy level, sleep pattern, or activity level. No fevers, chills. Eyes: No visual changes or diplopia. No scleral icterus. ENT: No Headaches, hearing loss or vertigo. No mouth sores or sore throat. Cardiovascular: as reviewed in HPI  Respiratory: No cough or wheezing, no sputum production. No hemoptysis. Gastrointestinal: No abdominal pain, appetite loss, blood in stools.  No change in bowel or bladder habits. Genitourinary: No dysuria, trouble voiding, or hematuria. Musculoskeletal:  No gait disturbance, no joint complaints. Integumentary: No rash or pruritis. Neurological: No headache, diplopia, change in muscle strength, numbness or tingling. Psychiatric: No anxiety or depression. Endocrine: No temperature intolerance. No excessive thirst, fluid intake, or urination. No tremor. Hematologic/Lymphatic: No abnormal bruising or bleeding, blood clots or swollen lymph nodes. Allergic/Immunologic: No nasal congestion or hives. Physical Exam:   /68 (Site: Left Upper Arm, Position: Sitting)   Pulse 84   Ht 5' 10\" (1.778 m)   Wt (!) 310 lb (140.6 kg)   SpO2 96%   BMI 44.48 kg/m²   Wt Readings from Last 3 Encounters:   01/25/23 (!) 310 lb (140.6 kg)   05/27/22 (!) 314 lb 6.4 oz (142.6 kg)   05/25/22 294 lb (133.4 kg)     Constitutional: He is an obese male who is oriented to person, place, and time. In no acute distress. Head: Normocephalic and atraumatic. Pupils equal and round. Neck: Neck supple. No JVP or carotid bruit appreciated. No mass and no thyromegaly present. No lymphadenopathy present. Cardiovascular: Normal rate. Normal heart sounds. Exam reveals no gallop and no friction rub. No murmur heard. Pulmonary/Chest: Effort normal and breath sounds normal. No respiratory distress. He has no wheezes, rhonchi or rales. Abdominal: Soft, non-tender. Bowel sounds are normal. He exhibits no organomegaly, mass or bruit. Extremities: Trace BLE edema. No cyanosis or clubbing. Pulses are 2+ radial/carotid bilaterally. Neurological: No gross cranial nerve deficit. Coordination normal.   Skin: Skin is warm and dry. There is no rash or diaphoresis. Psychiatric: He has a normal mood and affect.  His speech is normal and behavior is normal.     Lab Review:   FLP:  No results found for: TRIG, HDL, LDLCALC, LDLDIRECT, LABVLDL  BUN/Creatinine:    Lab Results   Component Value Date/Time    BUN 15 05/21/2022 07:05 PM    CREATININE 0.6 05/21/2022 07:05 PM     EKG Interpretation: 12/21/21Sinus rhythm. Low voltage in precordial leads. Cannot rule out old inferior infarct. 5/25/22 Sinus rhythm.  1/25/23 Sinus rhythm. Low voltage in precordial leads. Image Review:     Stress test 10/14/21 Personally reviewed. Normal treadmill exercise stress portion of the study. Moderate size multiple reversible perfusion defects involving the apex, anterior and inferior walls suggestive of ischemia  Gated Study shows normal LV size and Systolic function; EF is 33%. The patient exercised for 6 min. on the Max protocol to achieve a HR of 150, which is 88% of PMHR. The study was stopped due to shortness of breath & exhaustion. There was no chest pain or ischemic ST changes. Coronary CTA 1/11/22  Calcium score measures 14. Coronary artery origins appear normal.  Scattered  plaque-like luminal irregularities are seen, with no flow limiting coronary  artery stenosis noted with the reservation that phase misregistration  artifact mildly limits the study. Echo 6/17/22   Left ventricular cavity size is normal with mild concentric left ventricular  hypertrophy. Overall left ventricular systolic function appears normal with an ejection fraction of 55-60. No regional wall motion abnormalities Normal diastolic function. Estimated pulmonary artery systolic pressure is at 28 mmHg assuming a right atrial pressure of 8 mmHg. The right ventricle is enlarged. TAPSE measures 2.25 cm and the RVS velocity measures 18.8 cm/s. IVC size is normal (<2.1 cm) but collapses < 50% with respiration consistent with elevated RA pressure (8 mmHg). Assessment/Plan:   1) PSVT. Converted with valsalva maneuver. Symptomatic with episodes and reports recurrent episodes. Normal LVEF. Previously referred to Dr. Elaina Waite to discuss an ablation but preferred conservative measures.  Discussed treatment options, scheduling the ablation, or schedule an appointment to see electrophysiology again. Educated again on proper valsalva maneuvers with any recurrence. Patient prefers to call if he would like to pursue scheduling the ablation. 2) Nonobstructive CAD/Abnormal stress test. Asymptomatic. Images personally reviewed. Multiple defects on the stress test but does not correlate with clinical findings/lack of exertional chest pain. Patient did not have attenuation correction on the stress test. He denies typical/exertional chest pain. Coronary CTA showed minimal nonobstructive CAD. No indication for angiography. Stress test defects likely artifactual. Continue Lipitor 40 mg daily as there is a mild degree of CAD. 3) Essential hypertension. Controlled. Goal BP <130/80. Continue medical therapy. 4) Morbid obesity. BMI 44. Encouraged weight loss and increase aerobic exercise as tolerated. 5) THERESA. Encouraged compliance and follow up with sleep medicine. 6) Hyperglycemia/Elevated LFTs. Will defer management to the PCP. Follow up with EP as needed and patient will call if he prefers to schedule ablation     Thank you very much for allowing me to participate in the care of your patient. Please do not hesitate to contact me if you have any questions. Sincerely,  Carson Seth. Edwar Chang, [address] [place] [address]  Ph: [phone]  Fax: [phone]    This note was scribed in the presence of Dr Edwar Chang MD by Alexander Cavazos RN. Physician Attestation: The scribes documentation has been prepared under my direction and personally reviewed by me in its entirety. I confirm that the note above accurately reflects all work, treatment, procedures, and medical decision making performed by me.  All portions of the note including but not limited to the chief complaint, history of present illness, physical exam, assessment and plan/medical decision making were personally reviewed, edited, and updated on the day of the visit.

## 2023-02-07 ENCOUNTER — TELEPHONE (OUTPATIENT)
Dept: CARDIOLOGY CLINIC | Age: 53
End: 2023-02-07

## 2023-02-07 DIAGNOSIS — I47.1 PSVT (PAROXYSMAL SUPRAVENTRICULAR TACHYCARDIA) (HCC): Primary | ICD-10-CM

## 2023-02-07 NOTE — TELEPHONE ENCOUNTER
Lisa,    Patient was seen by Dr. Vida Schrader 5/27/22, he then saw Dr. Santhosh Bolivar after a recurrence. He would like to proceed with scheduling the ablation. I instructed the patient we would call to facilitate scheduling or if the patient would need to be seen in office would schedule a follow up.

## 2023-02-07 NOTE — TELEPHONE ENCOUNTER
Mukul Tejeda was recently in the ER for palpitations. Upon discharge, it was recommended that Mukul Tejeda contact his cardiologist. In Dr. Alberto Burris absence, Mukul Tejeda is willing to see a NP. He also mentioned to the ER staff that he was in talks of getting an ablation and is now willing to discuss the procedure option. Mukul Tejeda can be reached at: [phone], he wants to know if he needs to make a f/u appointment.

## 2023-02-08 NOTE — TELEPHONE ENCOUNTER
Please reach out to patient and schedule SVT ablation with Dr. Evin Le. Please review instructions below with patient. Anesthesia is needed   Include Carto Rep in email [email or web address]    Supraventricular Tachycardia Ablation Pre procedure Instructions    Date:______________________________    Monica Smithten at:__________________________    Procedure time:____________________    The morning of your procedure you will park in the hospital parking lot and report directly to the cath lab to check in. At the information desk stay right and go all the way to the end of the rincon, this will take you directly to your check in desk for the cath lab. Pre-Procedure Instructions   You will need to fast (nothing to eat or drink) after midnight the day of your procedure. Do NOT chew gum or eat mints the day of your procedure. You will need to hold your Metoprolol for 5 days prior to the procedure. You will need to hold your Lisinopril and hydrochlorothiazide the morning of the procedure. Do not use any lotions, creams or perfume the morning of procedure. You will need to complete pre-procedure lab work 5-7 days prior to your procedure. Please have a responsible adult to drive you home after procedure, you should go home same day, but there is always a possibility of an overnight stay. Cath lab will provide you with all post procedure instructions                                          [address] [place] [address]/[facility]. [address] [place] [address]  Phone: [phone]  The hours are Mon -Fri.   6:30 am - 4:00 pm   Saturday 8:00 am - noon  No appointment necessary

## 2023-02-08 NOTE — TELEPHONE ENCOUNTER
Spoke with the patient and he would like to come in and see Dr John Jordan prior to having this done to understand procedure. We went ahead and scheduled and he verbalized understanding. Supraventricular Tachycardia Ablation Pre procedure Instructions     Date: 3/13/2023     Arrive at:  10:00 am   Procedure time: 11:30 am      The morning of your procedure you will park in the hospital parking lot and report directly to the cath lab to check in. At the information desk stay right and go all the way to the end of the rincon, this will take you directly to your check in desk for the cath lab. Pre-Procedure Instructions   You will need to fast (nothing to eat or drink) after midnight the day of your procedure. Do NOT chew gum or eat mints the day of your procedure. You will need to hold your Metoprolol for 5 days prior to the procedure. You will need to hold your Lisinopril and hydrochlorothiazide the morning of the procedure. Do not use any lotions, creams or perfume the morning of procedure. You will need to complete pre-procedure lab work 5-7 days prior to your procedure. Please have a responsible adult to drive you home after procedure, you should go home same day, but there is always a possibility of an overnight stay. Cath lab will provide you with all post procedure instructions                                             [address] [place] [address]/[facility]  [facility] Pending sale to [facility]. [address], Matt [address]esma [address]  Phone: [phone]  The hours are Mon -Fri.   6:30 am - 4:00 pm   Saturday 8:00 am - noon  No appointment necessary            Teams updated / emailed cath lab

## 2023-03-03 ENCOUNTER — OFFICE VISIT (OUTPATIENT)
Dept: CARDIOLOGY CLINIC | Age: 53
End: 2023-03-03
Payer: COMMERCIAL

## 2023-03-03 ENCOUNTER — TELEPHONE (OUTPATIENT)
Dept: CARDIOLOGY CLINIC | Age: 53
End: 2023-03-03

## 2023-03-03 VITALS
SYSTOLIC BLOOD PRESSURE: 132 MMHG | BODY MASS INDEX: 43.33 KG/M2 | HEART RATE: 63 BPM | WEIGHT: 302 LBS | DIASTOLIC BLOOD PRESSURE: 68 MMHG

## 2023-03-03 DIAGNOSIS — I25.10 CORONARY ARTERY DISEASE INVOLVING NATIVE CORONARY ARTERY OF NATIVE HEART WITHOUT ANGINA PECTORIS: ICD-10-CM

## 2023-03-03 DIAGNOSIS — G47.33 OSA (OBSTRUCTIVE SLEEP APNEA): ICD-10-CM

## 2023-03-03 DIAGNOSIS — I48.3 TYPICAL ATRIAL FLUTTER (HCC): ICD-10-CM

## 2023-03-03 DIAGNOSIS — E66.01 MORBID OBESITY (HCC): ICD-10-CM

## 2023-03-03 DIAGNOSIS — I48.0 PAF (PAROXYSMAL ATRIAL FIBRILLATION) (HCC): ICD-10-CM

## 2023-03-03 DIAGNOSIS — I10 ESSENTIAL HYPERTENSION: ICD-10-CM

## 2023-03-03 DIAGNOSIS — I47.1 PSVT (PAROXYSMAL SUPRAVENTRICULAR TACHYCARDIA) (HCC): Primary | ICD-10-CM

## 2023-03-03 PROCEDURE — G8427 DOCREV CUR MEDS BY ELIG CLIN: HCPCS | Performed by: INTERNAL MEDICINE

## 2023-03-03 PROCEDURE — 93000 ELECTROCARDIOGRAM COMPLETE: CPT | Performed by: INTERNAL MEDICINE

## 2023-03-03 PROCEDURE — 99215 OFFICE O/P EST HI 40 MIN: CPT | Performed by: INTERNAL MEDICINE

## 2023-03-03 PROCEDURE — 3017F COLORECTAL CA SCREEN DOC REV: CPT | Performed by: INTERNAL MEDICINE

## 2023-03-03 PROCEDURE — G8417 CALC BMI ABV UP PARAM F/U: HCPCS | Performed by: INTERNAL MEDICINE

## 2023-03-03 PROCEDURE — 3078F DIAST BP <80 MM HG: CPT | Performed by: INTERNAL MEDICINE

## 2023-03-03 PROCEDURE — G8484 FLU IMMUNIZE NO ADMIN: HCPCS | Performed by: INTERNAL MEDICINE

## 2023-03-03 PROCEDURE — 1036F TOBACCO NON-USER: CPT | Performed by: INTERNAL MEDICINE

## 2023-03-03 PROCEDURE — 3074F SYST BP LT 130 MM HG: CPT | Performed by: INTERNAL MEDICINE

## 2023-03-03 RX ORDER — APIXABAN 5 MG/1
5 TABLET, FILM COATED ORAL 2 TIMES DAILY
COMMUNITY
Start: 2023-02-04 | End: 2023-03-07 | Stop reason: SDUPTHER

## 2023-03-03 NOTE — TELEPHONE ENCOUNTER
Please contact [facility] [address] and obtain EKG tracing for all EKG completed on 02/04/23.

## 2023-03-03 NOTE — TELEPHONE ENCOUNTER
Drea,    Patient is scheduled for SVT ablation on 03/13/2023 need to add on atrial fibrillation and typical atrial flutter ablation. Patient already has the instructions and I have added on TYPE and screen to his lab and instructed for him to complete 5-7 days prior. Instructed to hold Metoprolol on Saturday and Sunday and Monday.     Thanks,  Rebekah Urena RN

## 2023-03-03 NOTE — PROGRESS NOTES
Cardiac Electrophysiology Consultation   Date: 3/3/2023   Reason for Consultation: SVT  Consult Requesting Physician: Elyssa Lund MD  Primary Care Physician: Live Silva MD    Chief Complaint:   Chief Complaint   Patient presents with    Atrial Fibrillation     HPI: Wanda Brown is a 47 y.o. patient with a history of HLD, HTN, sleep apnea. He presented to [facility] [place] [facility] on 5/21/2022 via EMS after he began experiencing symptoms of palpitations and heart racing at home. EMS had him perform vagal maneuvers and he converted to NSR. We have requested tracing for EKG completed by EMS as well as note from the transport. He was first seen in office on 05/27/2022 referred by his primary cardiologist Dr. Aly Farrell for evaluation of SVT. He recalled the story that brought him the the ED. He states he was coloring his wife's hair and when he got done, his heart starting pounding. He states that was the first episode he experienced. He took an ASA and called 911 and was found to have a HR of 200. His wife got their neighbor who has SVT hx and ablation who had the patient perform vagal maneuvers. It terminated the fast rhythm while enroute to the ED. Treatment options dicussed. Patient chose conservative treatment with use of BB and vagal maneuvers to terminate the episodes of SVT. He was seen in office on  01/25/2023 by Mckay Gutierrez MD and reported reoccurrence of SVT on 01/24/2022 with symptoms of palpitations and heart racing. Reported symptoms resolved with valsalva maneuver but reports feeling fatigued and weak the rest of the day. He presented to [facility] ED on 02/04/2023 reporting symptoms of palpitations. Diagnosed with atrial fibrillation /atrial flutter. He was given Metoprolol bolus x 3 and converted to NSR Started on Eliquis 5 mg BID. Today, he presents to office for follow up to discuss new atrial fibrillation/ flutter. EKG today shows SR. He recalls his visit to the ED.  He reports that they had tried terminating the fast heart rhythm  by putting his legs above his head. Reports compliance with medications and tolerating them well. Denies chest pain/pressure, tightness, edema, shortness of breath, heart racing, palpitations, lightheadedness, dizziness, syncope, presyncope,  PND or orthopnea. Past Medical History:   Diagnosis Date    Hyperlipidemia     Hypertension     Sleep apnea       Past Surgical History:   Procedure Laterality Date    ROTATOR CUFF REPAIR Right        Allergies:  No Known Allergies    Medication:   Prior to Admission medications    Medication Sig Start Date End Date Taking? Authorizing Provider   ELIQUIS 5 MG TABS tablet Take 5 mg by mouth 2 times daily 2/4/23  Yes Historical Provider, MD   atorvastatin (LIPITOR) 40 MG tablet TAKE ONE TABLET BY MOUTH DAILY 11/10/22  Yes Tabby Laura MD   metoprolol tartrate (LOPRESSOR) 25 MG tablet Take 1 tablet by mouth daily as needed (for heart racing SVT) 5/27/22  Yes Beulah Myers MD   hydroCHLOROthiazide (HYDRODIURIL) 25 MG tablet Take 25 mg by mouth daily  12/16/21  Yes Historical Provider, MD   lisinopril (PRINIVIL;ZESTRIL) 10 MG tablet Take 10 mg by mouth daily   Patient not taking: Reported on 3/3/2023 12/11/21   Historical Provider, MD       Social History:   reports that he quit smoking about 31 years ago. His smoking use included cigarettes. He has never used smokeless tobacco. He reports current alcohol use. He reports that he does not use drugs. Family History:  family history includes Heart Attack in his father; Hypertension in his paternal uncle; Other in an other family member. Reviewed. Denies family history of sudden cardiac death, arrhythmia, premature CAD    Review of System:  Pertinent positive and negatives are in the HPI, the rest are negative. Physical Examination:  /68   Pulse 63   Wt (!) 302 lb (137 kg)   BMI 43.33 kg/m²      Constitutional: Oriented. No distress.    Head: Normocephalic and atraumatic. Mouth/Throat: Oropharynx is clear and moist.   Eyes: Conjunctivae normal. EOM are normal.   Neck: Normal range of motion. Neck supple. No rigidity. No JVD present. Cardiovascular: Normal rate, regular rhythm, S1&S2 and intact distal pulses. Pulmonary/Chest: Bilateral respiratory sounds. No wheezes. No rhonchi. Abdominal: Soft. Bowel sounds present. No distension, No tenderness. Musculoskeletal: No tenderness. No edema    Lymphadenopathy: Has no cervical adenopathy. Neurological: Alert and oriented. Cranial nerve appears intact, No Gross deficit   Skin: Skin is warm and dry. No rash noted. Psychiatric: Has a normal mood, affect and behavior     Labs:  Reviewed. ECG: reviewed,  Sinus rhythm with v-rate of 63 bpm with QRS duration 112 ms. No pathologic Q waves, ventricular pre-excitation, or QT prolongation. Studies:   1. Event monitor: n/a      2. Echo: 06/17/2022  Summary   Left ventricular cavity size is normal with mild concentric left ventricular   hypertrophy. Overall left ventricular systolic function appears normal with an ejection   fraction of 55-60. No regional wall motion abnormalities   Normal diastolic function. Estimated pulmonary artery systolic pressure is at 28 mmHg assuming a right   atrial pressure of 8 mmHg. The right ventricle is enlarged. TAPSE measures 2.25 cm and the RVS velocity measures 18.8 cm/s. IVC size is   normal (<2.1 cm) but collapses < 50% with respiration consistent with   elevated RA pressure (8 mmHg). 3. Stress Test:  10/14/2021   Normal treadmill exercise stress portion of the study. Moderate size multiple reversible perfusion defects involving the apex,  anterior and inferior walls suggestive of ischemia  Gated Study shows normal LV size and Systolic function; EF is 34%. The patient exercised for 6 min. on the Max protocol to achieve a HR of  150, which is 88% of PMHR.  The study was stopped due to shortness of breath& exhaustion. There was no chest pain or ischemic ST changes. 4. Cath: n/a    5. Coronary CTA 1/11/2022  Calcium score measures 14. Coronary artery origins appear normal.  Scattered  plaque-like luminal irregularities are seen, with no flow limiting coronary  artery stenosis noted with the reservation that phase misregistration  artifact mildly limits the study. I independently reviewed the ECG, MCOT, echocardiogram, stress test, and coronary CTA results and used them for my plan of care. [id number] Score for Atrial Fibrillation Stroke Risk   Risk   Factors  Component Value   C CHF No 0   H HTN Yes 1   A2 Age >= 76 No,  (55 y.o.) 0   D DM No 0   S2 Prior Stroke/TIA No 0   V Vascular Disease No 0   A Age 74-69 No,  (55 y.o.) 0   Sc Sex male 0    [id number]  Score  1   Score last updated 3/7/23 6:10 PM EST    Click here for a link to the UpToDate guideline \"Atrial Fibrillation: Anticoagulation therapy to prevent embolization    Disclaimer: Risk Score calculation is dependent on accuracy of patient problem list and past encounter diagnosis. Assessment/Plan:     Paroxsymal supraventricular tachycardia   -Documented by EMS but actual EKG tracing is not available today. -Requested records and EKG tracing from [employer]. -Converted with valsalva maneuver per ED report. - Scheduled for ablation on 03/13/2023. Mr. Princess Lyon has hx of likely SVT terminated with vagal maneuvers. Recently he was diagnosed with atrial fibrillation/flutter and felt different than when he had SVT. We discussed treatment options as noted below. Pt strongly prefers to proceed with ablation for afib/flutter as well as EPS for SVT with possible ablation for that as well. Risks and benefits of the procedure discussed. Continue anticoagulation for now. Will stop after 3 months post ablation.    -Discussed self termination techniques to use for reoccurrence  of SVT.  Bearing down (valsalva maneuver), cold towel from ICE water on face. Atrial fibrillation / Typical atrial flutter  - Noted on EKG 02/24/2023 at [facility]. No tracing are available for review have requested medical records. - Patient has a [id number] Score 1 (HTN)  - Continue Eliquis 5 mg BID for thromboembolic risk reduction.  - Tolerating well no signs or symptoms of abnormal bruising or bleeding. See discussion above. - Atrial fibrillation / Atrial flutter risk factors including age, HTN, obesity, inactivity and THERESA were discussed with patient. Risk factor modification recommended     - Treatment options including cardioversion, rate control strategy, antiarrhythmics, anticoagulation and possible ablation were discussed with patient. Risks, benefits and alternative of each treatment options were explained. All questions answered  ~ Information given regarding ablation for pt to review  ~ The risks, benefits and alternatives of the ablation procedure were discussed with the patient. The risks including, but not limited to, the risks of bleeding, infection, radiation exposure, injury to vascular, cardiac and surrounding structures (including pneumothorax), stroke, cardiac perforation, tamponade, need for emergent open heart surgery, need for pacemaker implantation, Injury to the phrenic nerve, injury to the esophagus, myocardial infarction and death were discussed in detail.   - I explained the success rate considering possible need for a second procedure is about 60-80% and with addition of anti arrhythmics is higher     Patient verbalized understanding of procedure, risks and benefits and wishes to proceed with ablation. Nonobstructive CAD   -Coronary CTA showed minimal nonobstructive CAD. -Continue with medical management and risk factor modification including statin. - Denies angina. Essential hypertension   -Goal BP <130/80.   - Continue medical therapy. Morbid obesity   -Body mass index is 43.33 kg/m².    -Encouraged weight loss and increase aerobic exercise as tolerated. THERESA   -Encouraged to follow up with sleep medicine. Follow ups:  - Follow up one week after procedure with myself and three months after with MIKE Hernandez CNP. -Continue routine follow up with Dr. Edwar Chang MD.      Thank you for allowing me to participate in the care of ePpito Ferrara. All questions and concerns were addressed to the patient/family. Alternatives to my treatment were discussed. This note was scribed in the presence of Joyce Vaughan MD by Paige Barone RN. Physician attestation: The scribe's documentation has been prepared under my direction and has been personally reviewed by me in its entirety. I confirm that the note above reflects all work, treatment, procedures, and medical decision making performed by me.      Joyce Vaughan MD  Cardiac Electrophysiology  [address]

## 2023-03-03 NOTE — PATIENT INSTRUCTIONS
If you have any question regarding your ablation please contact Dr. Tavo Velasco Nurse at [phone]. SVT / atrial fibrillation / Right atrial flutter Ablation Pre procedure Instructions    Date: 03/13/2023    Arrive at: 10:00 am    Procedure time: 11:30 am    The morning of your procedure you will park in the hospital parking lot and report directly to the cath lab to check in. At the information desk stay right and go all the way to the end of the rincon, this will take you directly to your check in desk for the cath lab. Pre-Procedure Instructions   You will need to fast (nothing to eat or drink) after midnight the day of your procedure. Do NOT chew gum or eat mints the day of your procedure  You will need to hold your Eliquis [facility]) the morning of the procedure. You will need to hold your blood pressure medications the morning of the procedure. Do NOT take your Metoprolol on Sat (3/11) Sun(3/12) and Monday (3/13)  Do not use any lotions, creams or perfume the morning of procedure. You will need to complete pre-procedure lab work 5-7 days prior to your procedure. Please have a responsible adult to drive you home after procedure, you should go home same day, but there is always a possibility of an overnight stay. Cath lab will provide you with all post procedure instructions  Follow up one month with Dr. Noreen Nelson after the procedure and three months with Orquidea Castillo CNP after procedure                                          [address]/[facility]. [address] [place] [address]  Phone: [phone]  The hours are Mon -Fri.   6:30 am - 4:00 pm   Saturday 8:00 am - noon  No appointment necessary

## 2023-03-07 RX ORDER — APIXABAN 5 MG/1
5 TABLET, FILM COATED ORAL 2 TIMES DAILY
Qty: 180 TABLET | Refills: 3 | Status: SHIPPED | OUTPATIENT
Start: 2023-03-07

## 2023-03-07 NOTE — TELEPHONE ENCOUNTER
Medication Refill    Medication needing refilled:  ELIQUIS    Dosage of the medication:  5 MG TABS tablet     How are you taking this medication (QD, BID, TID, QID, PRN):  Take 5 mg by mouth 2 times daily    30 or 90 day supply called in: 30 day supply     When will you run out of your medication:     Which Pharmacy are we sending the medication to?:    [address] [phone] Celso Donohue [phone]   [facility] Janse Rojas, [address] [place] [address]   Phone:  [phone]  Fax:  [phone]

## 2023-03-08 DIAGNOSIS — I47.1 PSVT (PAROXYSMAL SUPRAVENTRICULAR TACHYCARDIA) (HCC): ICD-10-CM

## 2023-03-08 DIAGNOSIS — I48.3 TYPICAL ATRIAL FLUTTER (HCC): ICD-10-CM

## 2023-03-08 DIAGNOSIS — I48.0 PAF (PAROXYSMAL ATRIAL FIBRILLATION) (HCC): ICD-10-CM

## 2023-03-08 LAB
ABO/RH: NORMAL
ANION GAP SERPL CALCULATED.3IONS-SCNC: 13 MMOL/L (ref 3–16)
ANTIBODY SCREEN: NORMAL
BUN BLDV-MCNC: 18 MG/DL (ref 7–20)
CALCIUM SERPL-MCNC: 9.7 MG/DL (ref 8.3–10.6)
CHLORIDE BLD-SCNC: 101 MMOL/L (ref 99–110)
CO2: 23 MMOL/L (ref 21–32)
CREAT SERPL-MCNC: 0.7 MG/DL (ref 0.9–1.3)
GFR SERPL CREATININE-BSD FRML MDRD: >60 ML/MIN/{1.73_M2}
GLUCOSE BLD-MCNC: 95 MG/DL (ref 70–99)
POTASSIUM SERPL-SCNC: 3.8 MMOL/L (ref 3.5–5.1)
SODIUM BLD-SCNC: 137 MMOL/L (ref 136–145)

## 2023-03-08 NOTE — TELEPHONE ENCOUNTER
Procedures added on 3/13 -  now SVT/Afib/R Afl Ablation.      Caro updated and emailed cath lab with changes

## 2023-03-09 LAB
HCT VFR BLD CALC: 46.1 % (ref 40.5–52.5)
HEMOGLOBIN: 15.7 G/DL (ref 13.5–17.5)
MCH RBC QN AUTO: 30.3 PG (ref 26–34)
MCHC RBC AUTO-ENTMCNC: 34.2 G/DL (ref 31–36)
MCV RBC AUTO: 88.7 FL (ref 80–100)
PDW BLD-RTO: 13 % (ref 12.4–15.4)
PLATELET # BLD: 256 K/UL (ref 135–450)
PMV BLD AUTO: 9.2 FL (ref 5–10.5)
RBC # BLD: 5.19 M/UL (ref 4.2–5.9)
WBC # BLD: 9.6 K/UL (ref 4–11)

## 2023-03-09 NOTE — TELEPHONE ENCOUNTER
I called [email or web address] ( [phone] )  they stated Carmenza Li was seen at [facility]. and they can not fax me EKG from them gave me [phone] to call I called there and they transferred me to the E.D. and then the E.D. gave me [phone] to call I called that Number they gave me a fax Number so I can fax it on our letter head.

## 2023-03-13 ENCOUNTER — ANESTHESIA (OUTPATIENT)
Dept: CARDIAC CATH/INVASIVE PROCEDURES | Age: 53
End: 2023-03-13

## 2023-03-13 ENCOUNTER — HOSPITAL ENCOUNTER (OUTPATIENT)
Dept: CARDIAC CATH/INVASIVE PROCEDURES | Age: 53
Discharge: HOME | End: 2023-03-13
Payer: COMMERCIAL

## 2023-03-13 ENCOUNTER — ANESTHESIA EVENT (OUTPATIENT)
Dept: CARDIAC CATH/INVASIVE PROCEDURES | Age: 53
End: 2023-03-13

## 2023-03-13 VITALS
DIASTOLIC BLOOD PRESSURE: 76 MMHG | WEIGHT: 298 LBS | OXYGEN SATURATION: 96 % | SYSTOLIC BLOOD PRESSURE: 125 MMHG | RESPIRATION RATE: 17 BRPM | HEART RATE: 78 BPM | HEIGHT: 70 IN | TEMPERATURE: 97.6 F | BODY MASS INDEX: 42.66 KG/M2

## 2023-03-13 LAB
ABO/RH: NORMAL
ANTIBODY SCREEN: NORMAL
EKG ATRIAL RATE: 71 BPM
EKG DIAGNOSIS: NORMAL
EKG P AXIS: 18 DEGREES
EKG P-R INTERVAL: 144 MS
EKG Q-T INTERVAL: 408 MS
EKG QRS DURATION: 98 MS
EKG QTC CALCULATION (BAZETT): 443 MS
EKG R AXIS: -3 DEGREES
EKG T AXIS: 22 DEGREES
EKG VENTRICULAR RATE: 71 BPM

## 2023-03-13 PROCEDURE — 7100000000 HC PACU RECOVERY - FIRST 15 MIN

## 2023-03-13 PROCEDURE — 2500000003 HC RX 250 WO HCPCS: Performed by: INTERNAL MEDICINE

## 2023-03-13 PROCEDURE — 2580000003 HC RX 258: Performed by: NURSE ANESTHETIST, CERTIFIED REGISTERED

## 2023-03-13 PROCEDURE — 93653 COMPRE EP EVAL TX SVT: CPT | Performed by: INTERNAL MEDICINE

## 2023-03-13 PROCEDURE — 3700000001 HC ADD 15 MINUTES (ANESTHESIA)

## 2023-03-13 PROCEDURE — C1760 CLOSURE DEV, VASC: HCPCS

## 2023-03-13 PROCEDURE — 93010 ELECTROCARDIOGRAM REPORT: CPT | Performed by: INTERNAL MEDICINE

## 2023-03-13 PROCEDURE — 2580000003 HC RX 258: Performed by: INTERNAL MEDICINE

## 2023-03-13 PROCEDURE — A4216 STERILE WATER/SALINE, 10 ML: HCPCS | Performed by: NURSE ANESTHETIST, CERTIFIED REGISTERED

## 2023-03-13 PROCEDURE — C1759 CATH, INTRA ECHOCARDIOGRAPHY: HCPCS

## 2023-03-13 PROCEDURE — 93662 INTRACARDIAC ECG (ICE): CPT | Performed by: INTERNAL MEDICINE

## 2023-03-13 PROCEDURE — 2500000003 HC RX 250 WO HCPCS: Performed by: NURSE ANESTHETIST, CERTIFIED REGISTERED

## 2023-03-13 PROCEDURE — 7100000001 HC PACU RECOVERY - ADDTL 15 MIN

## 2023-03-13 PROCEDURE — 93653 COMPRE EP EVAL TX SVT: CPT

## 2023-03-13 PROCEDURE — 2500000003 HC RX 250 WO HCPCS

## 2023-03-13 PROCEDURE — A4216 STERILE WATER/SALINE, 10 ML: HCPCS

## 2023-03-13 PROCEDURE — 86900 BLOOD TYPING SEROLOGIC ABO: CPT

## 2023-03-13 PROCEDURE — 6360000002 HC RX W HCPCS: Performed by: NURSE ANESTHETIST, CERTIFIED REGISTERED

## 2023-03-13 PROCEDURE — 2709999900 HC NON-CHARGEABLE SUPPLY

## 2023-03-13 PROCEDURE — 6360000002 HC RX W HCPCS

## 2023-03-13 PROCEDURE — 86850 RBC ANTIBODY SCREEN: CPT

## 2023-03-13 PROCEDURE — 2580000003 HC RX 258

## 2023-03-13 PROCEDURE — 93655 ICAR CATH ABLTJ DSCRT ARRHYT: CPT

## 2023-03-13 PROCEDURE — 93623 PRGRMD STIMJ&PACG IV RX NFS: CPT

## 2023-03-13 PROCEDURE — 93623 PRGRMD STIMJ&PACG IV RX NFS: CPT | Performed by: INTERNAL MEDICINE

## 2023-03-13 PROCEDURE — 93662 INTRACARDIAC ECG (ICE): CPT

## 2023-03-13 PROCEDURE — 93005 ELECTROCARDIOGRAM TRACING: CPT | Performed by: INTERNAL MEDICINE

## 2023-03-13 PROCEDURE — C1894 INTRO/SHEATH, NON-LASER: HCPCS

## 2023-03-13 PROCEDURE — C1732 CATH, EP, DIAG/ABL, 3D/VECT: HCPCS

## 2023-03-13 PROCEDURE — 86901 BLOOD TYPING SEROLOGIC RH(D): CPT

## 2023-03-13 PROCEDURE — C1730 CATH, EP, 19 OR FEW ELECT: HCPCS

## 2023-03-13 PROCEDURE — 3700000000 HC ANESTHESIA ATTENDED CARE

## 2023-03-13 RX ORDER — ONDANSETRON 2 MG/ML
4 INJECTION INTRAMUSCULAR; INTRAVENOUS
Status: DISCONTINUED | OUTPATIENT
Start: 2023-03-13 | End: 2023-03-14 | Stop reason: HOSPADM

## 2023-03-13 RX ORDER — DROPERIDOL 2.5 MG/ML
0.62 INJECTION, SOLUTION INTRAMUSCULAR; INTRAVENOUS
Status: DISCONTINUED | OUTPATIENT
Start: 2023-03-13 | End: 2023-03-14 | Stop reason: HOSPADM

## 2023-03-13 RX ORDER — SODIUM CHLORIDE 9 MG/ML
INJECTION, SOLUTION INTRAVENOUS PRN
Status: DISCONTINUED | OUTPATIENT
Start: 2023-03-13 | End: 2023-03-14 | Stop reason: HOSPADM

## 2023-03-13 RX ORDER — VECURONIUM BROMIDE 1 MG/ML
INJECTION, POWDER, LYOPHILIZED, FOR SOLUTION INTRAVENOUS PRN
Status: DISCONTINUED | OUTPATIENT
Start: 2023-03-13 | End: 2023-03-13 | Stop reason: SDUPTHER

## 2023-03-13 RX ORDER — FENTANYL CITRATE 50 UG/ML
INJECTION, SOLUTION INTRAMUSCULAR; INTRAVENOUS PRN
Status: DISCONTINUED | OUTPATIENT
Start: 2023-03-13 | End: 2023-03-13 | Stop reason: SDUPTHER

## 2023-03-13 RX ORDER — SODIUM CHLORIDE 0.9 % (FLUSH) 0.9 %
5-40 SYRINGE (ML) INJECTION EVERY 12 HOURS SCHEDULED
Status: DISCONTINUED | OUTPATIENT
Start: 2023-03-13 | End: 2023-03-14 | Stop reason: HOSPADM

## 2023-03-13 RX ORDER — ONDANSETRON 2 MG/ML
INJECTION INTRAMUSCULAR; INTRAVENOUS PRN
Status: DISCONTINUED | OUTPATIENT
Start: 2023-03-13 | End: 2023-03-13 | Stop reason: SDUPTHER

## 2023-03-13 RX ORDER — ACETAMINOPHEN 325 MG/1
650 TABLET ORAL EVERY 4 HOURS PRN
Status: DISCONTINUED | OUTPATIENT
Start: 2023-03-13 | End: 2023-03-14 | Stop reason: HOSPADM

## 2023-03-13 RX ORDER — HEPARIN SODIUM 1000 [USP'U]/ML
INJECTION, SOLUTION INTRAVENOUS; SUBCUTANEOUS PRN
Status: DISCONTINUED | OUTPATIENT
Start: 2023-03-13 | End: 2023-03-13 | Stop reason: SDUPTHER

## 2023-03-13 RX ORDER — GLYCOPYRROLATE 0.2 MG/ML
INJECTION INTRAMUSCULAR; INTRAVENOUS PRN
Status: DISCONTINUED | OUTPATIENT
Start: 2023-03-13 | End: 2023-03-13 | Stop reason: SDUPTHER

## 2023-03-13 RX ORDER — SODIUM CHLORIDE 9 MG/ML
INJECTION, SOLUTION INTRAVENOUS CONTINUOUS PRN
Status: DISCONTINUED | OUTPATIENT
Start: 2023-03-13 | End: 2023-03-13 | Stop reason: SDUPTHER

## 2023-03-13 RX ORDER — PROPOFOL 10 MG/ML
INJECTION, EMULSION INTRAVENOUS PRN
Status: DISCONTINUED | OUTPATIENT
Start: 2023-03-13 | End: 2023-03-13 | Stop reason: SDUPTHER

## 2023-03-13 RX ORDER — LIDOCAINE HYDROCHLORIDE 20 MG/ML
INJECTION, SOLUTION EPIDURAL; INFILTRATION; INTRACAUDAL; PERINEURAL PRN
Status: DISCONTINUED | OUTPATIENT
Start: 2023-03-13 | End: 2023-03-13 | Stop reason: SDUPTHER

## 2023-03-13 RX ORDER — SODIUM CHLORIDE 0.9 % (FLUSH) 0.9 %
5-40 SYRINGE (ML) INJECTION PRN
Status: DISCONTINUED | OUTPATIENT
Start: 2023-03-13 | End: 2023-03-14 | Stop reason: HOSPADM

## 2023-03-13 RX ORDER — FENTANYL CITRATE 50 UG/ML
25 INJECTION, SOLUTION INTRAMUSCULAR; INTRAVENOUS EVERY 5 MIN PRN
Status: DISCONTINUED | OUTPATIENT
Start: 2023-03-13 | End: 2023-03-14 | Stop reason: HOSPADM

## 2023-03-13 RX ORDER — MIDAZOLAM HYDROCHLORIDE 1 MG/ML
INJECTION INTRAMUSCULAR; INTRAVENOUS PRN
Status: DISCONTINUED | OUTPATIENT
Start: 2023-03-13 | End: 2023-03-13 | Stop reason: SDUPTHER

## 2023-03-13 RX ORDER — OXYCODONE HYDROCHLORIDE 5 MG/1
5 TABLET ORAL
Status: DISCONTINUED | OUTPATIENT
Start: 2023-03-13 | End: 2023-03-14 | Stop reason: HOSPADM

## 2023-03-13 RX ORDER — DEXAMETHASONE SODIUM PHOSPHATE 4 MG/ML
INJECTION, SOLUTION INTRA-ARTICULAR; INTRALESIONAL; INTRAMUSCULAR; INTRAVENOUS; SOFT TISSUE PRN
Status: DISCONTINUED | OUTPATIENT
Start: 2023-03-13 | End: 2023-03-13 | Stop reason: SDUPTHER

## 2023-03-13 RX ORDER — SUCCINYLCHOLINE/SOD CL,ISO/PF 200MG/10ML
SYRINGE (ML) INTRAVENOUS PRN
Status: DISCONTINUED | OUTPATIENT
Start: 2023-03-13 | End: 2023-03-13 | Stop reason: SDUPTHER

## 2023-03-13 RX ORDER — SODIUM CHLORIDE 9 MG/ML
INJECTION INTRAVENOUS PRN
Status: DISCONTINUED | OUTPATIENT
Start: 2023-03-13 | End: 2023-03-13 | Stop reason: SDUPTHER

## 2023-03-13 RX ADMIN — Medication 160 MG: at 11:58

## 2023-03-13 RX ADMIN — HEPARIN SODIUM 2000 UNITS: 1000 INJECTION INTRAVENOUS; SUBCUTANEOUS at 12:38

## 2023-03-13 RX ADMIN — FENTANYL CITRATE 100 MCG: 50 INJECTION INTRAMUSCULAR; INTRAVENOUS at 11:53

## 2023-03-13 RX ADMIN — VECURONIUM BROMIDE 6 MG: 1 INJECTION, POWDER, LYOPHILIZED, FOR SOLUTION INTRAVENOUS at 12:02

## 2023-03-13 RX ADMIN — PROPOFOL 150 MG: 10 INJECTION, EMULSION INTRAVENOUS at 11:58

## 2023-03-13 RX ADMIN — SODIUM CHLORIDE: 9 INJECTION, SOLUTION INTRAVENOUS at 11:53

## 2023-03-13 RX ADMIN — ONDANSETRON 4 MG: 2 INJECTION INTRAMUSCULAR; INTRAVENOUS at 12:04

## 2023-03-13 RX ADMIN — DEXAMETHASONE SODIUM PHOSPHATE 8 MG: 4 INJECTION, SOLUTION INTRAMUSCULAR; INTRAVENOUS at 12:04

## 2023-03-13 RX ADMIN — SUGAMMADEX 300 MG: 100 INJECTION, SOLUTION INTRAVENOUS at 13:30

## 2023-03-13 RX ADMIN — MIDAZOLAM 2 MG: 1 INJECTION INTRAMUSCULAR; INTRAVENOUS at 11:53

## 2023-03-13 RX ADMIN — SODIUM CHLORIDE 6 ML: 9 INJECTION INTRAMUSCULAR; INTRAVENOUS; SUBCUTANEOUS at 12:02

## 2023-03-13 RX ADMIN — GLYCOPYRROLATE 0.2 MG: 0.2 INJECTION, SOLUTION INTRAMUSCULAR; INTRAVENOUS at 12:04

## 2023-03-13 RX ADMIN — SODIUM CHLORIDE 0.5 MCG/MIN: 9 INJECTION, SOLUTION INTRAVENOUS at 12:47

## 2023-03-13 RX ADMIN — LIDOCAINE HYDROCHLORIDE 100 MG: 20 INJECTION, SOLUTION EPIDURAL; INFILTRATION; INTRACAUDAL; PERINEURAL at 11:58

## 2023-03-13 ASSESSMENT — PAIN DESCRIPTION - ONSET: ONSET: ON-GOING

## 2023-03-13 ASSESSMENT — PAIN DESCRIPTION - ORIENTATION: ORIENTATION: LOWER

## 2023-03-13 ASSESSMENT — LIFESTYLE VARIABLES: SMOKING_STATUS: 0

## 2023-03-13 ASSESSMENT — PAIN DESCRIPTION - PAIN TYPE: TYPE: CHRONIC PAIN

## 2023-03-13 ASSESSMENT — PAIN DESCRIPTION - FREQUENCY: FREQUENCY: CONTINUOUS

## 2023-03-13 ASSESSMENT — PAIN DESCRIPTION - LOCATION: LOCATION: BACK

## 2023-03-13 ASSESSMENT — PAIN DESCRIPTION - DESCRIPTORS: DESCRIPTORS: ACHING

## 2023-03-13 ASSESSMENT — PAIN SCALES - GENERAL
PAINLEVEL_OUTOF10: 6
PAINLEVEL_OUTOF10: 6

## 2023-03-13 NOTE — PROGRESS NOTES
To pacu from cath lab. Right groin site soft, no bleeding or hematoma noted. Pedal pulses palpable. IV infusing. Monitor in sinus rhythm.

## 2023-03-13 NOTE — ANESTHESIA PRE PROCEDURE
Department of Anesthesiology  Preprocedure Note       Name:  Joanna Duffy   Age:  47 y.o.  :  1970                                          MRN:  [id number]         Date:  3/13/2023      Surgeon: * No surgeons listed *    Procedure:     Medications prior to admission:   Prior to Admission medications    Medication Sig Start Date End Date Taking?  Authorizing Provider   ELIQUIS 5 MG TABS tablet Take 1 tablet by mouth 2 times daily 3/7/23   Babak Alvarez MD   atorvastatin (LIPITOR) 40 MG tablet TAKE ONE TABLET BY MOUTH DAILY 11/10/22   Jennifer Rodriguez MD   metoprolol tartrate (LOPRESSOR) 25 MG tablet Take 1 tablet by mouth daily as needed (for heart racing SVT) 22   Babak Alvarez MD   hydroCHLOROthiazide (HYDRODIURIL) 25 MG tablet Take 25 mg by mouth daily  21   Historical Provider, MD   lisinopril (PRINIVIL;ZESTRIL) 10 MG tablet Take 10 mg by mouth daily   Patient not taking: Reported on 3/13/2023 12/11/21   Historical Provider, MD       Current medications:    Current Outpatient Medications   Medication Sig Dispense Refill    ELIQUIS 5 MG TABS tablet Take 1 tablet by mouth 2 times daily 180 tablet 3    atorvastatin (LIPITOR) 40 MG tablet TAKE ONE TABLET BY MOUTH DAILY 90 tablet 2    metoprolol tartrate (LOPRESSOR) 25 MG tablet Take 1 tablet by mouth daily as needed (for heart racing SVT) 30 tablet 5    hydroCHLOROthiazide (HYDRODIURIL) 25 MG tablet Take 25 mg by mouth daily       lisinopril (PRINIVIL;ZESTRIL) 10 MG tablet Take 10 mg by mouth daily  (Patient not taking: Reported on 3/13/2023)       Current Facility-Administered Medications   Medication Dose Route Frequency Provider Last Rate Last Admin    sodium chloride flush 0.9 % injection 5-40 mL  5-40 mL IntraVENous 2 times per day Babak Alvarez MD        sodium chloride flush 0.9 % injection 5-40 mL  5-40 mL IntraVENous PRN Babak Alvarez MD        0.9 % sodium chloride infusion   IntraVENous PRN MD Annita Maradiaga isoproterenol (ISUPREL) 250 mcg in sodium chloride 0.9 % 250 mL infusion  250 mcg IntraVENous Once Diane Mcgregor MD           Allergies:  No Known Allergies    Problem List:    Patient Active Problem List   Diagnosis Code    THERESA (obstructive sleep apnea) G47.33    PLMD (periodic limb movement disorder) G47.61    Essential hypertension I10       Past Medical History:        Diagnosis Date    Hyperlipidemia     Hypertension     Sleep apnea        Past Surgical History:        Procedure Laterality Date    ROTATOR CUFF REPAIR Right        Social History:    Social History     Tobacco Use    Smoking status: Former     Types: Cigarettes     Quit date: 3/18/1991     Years since quittin.0    Smokeless tobacco: Never   Substance Use Topics    Alcohol use: Yes     Comment: rarely                                Counseling given: Not Answered      Vital Signs (Current):   Vitals:    23 1027   BP: (!) 143/90   Pulse: 77   Resp: 18   Temp: 97.2 °F (36.2 °C)   TempSrc: Infrared   SpO2: 97%   Weight: 298 lb (135.2 kg)   Height: 5' 10\" (1.778 m)                                              BP Readings from Last 3 Encounters:   23 (!) 143/90   23 132/68   23 122/68       NPO Status:                                                                                 BMI:   Wt Readings from Last 3 Encounters:   23 298 lb (135.2 kg)   23 (!) 302 lb (137 kg)   23 (!) 310 lb (140.6 kg)     Body mass index is 42.76 kg/m².     CBC:   Lab Results   Component Value Date/Time    WBC 9.6 2023 04:24 PM    RBC 5.19 2023 04:24 PM    HGB 15.7 2023 04:24 PM    HCT 46.1 2023 04:24 PM    MCV 88.7 2023 04:24 PM    RDW 13.0 2023 04:24 PM     2023 04:24 PM       CMP:   Lab Results   Component Value Date/Time     2023 04:24 PM    K 3.8 2023 04:24 PM    K 3.9 2022 08:20 AM     2023 04:24 PM    CO2 23 2023 04:24 PM BUN 18 03/08/2023 04:24 PM    CREATININE 0.7 03/08/2023 04:24 PM    GFRAA >60 05/21/2022 07:05 PM    AGRATIO 1.5 05/21/2022 07:05 PM    LABGLOM >60 03/08/2023 04:24 PM    GLUCOSE 95 03/08/2023 04:24 PM    PROT 6.8 05/21/2022 07:05 PM    CALCIUM 9.7 03/08/2023 04:24 PM    BILITOT 0.6 05/21/2022 07:05 PM    ALKPHOS 129 05/21/2022 07:05 PM    AST 75 05/21/2022 07:05 PM     05/21/2022 07:05 PM       POC Tests: No results for input(s): POCGLU, POCNA, POCK, POCCL, POCBUN, POCHEMO, POCHCT in the last 72 hours. Coags: No results found for: PROTIME, INR, APTT    HCG (If Applicable): No results found for: PREGTESTUR, PREGSERUM, HCG, HCGQUANT     ABGs: No results found for: PHART, PO2ART, IKF0OOR, MMO1IJE, BEART, P0OFPATP     Type & Screen (If Applicable):  No results found for: LABABO, LABRH    Drug/Infectious Status (If Applicable):  No results found for: HIV, HEPCAB    COVID-19 Screening (If Applicable): No results found for: COVID19        Anesthesia Evaluation  Patient summary reviewed no history of anesthetic complications:   Airway: Mallampati: III  TM distance: >3 FB   Neck ROM: full  Mouth opening: > = 3 FB   Dental: normal exam         Pulmonary:   (+) sleep apnea:      (-) not a current smoker                           Cardiovascular:  Exercise tolerance: good (>4 METS),   (+) hypertension:, CAD: non-obstructive, dysrhythmias: atrial fibrillation and SVT, hyperlipidemia    (-) past MI, CABG/stent,  angina,  CHF, orthopnea and  HAN      Rhythm: regular  Rate: normal                 ROS comment: TTE:   Conclusions      Summary   Left ventricular cavity size is normal with mild concentric left ventricular   hypertrophy. Overall left ventricular systolic function appears normal with an ejection   fraction of 55-60. No regional wall motion abnormalities   Normal diastolic function. Estimated pulmonary artery systolic pressure is at 28 mmHg assuming a right   atrial pressure of 8 mmHg.    The right ventricle is enlarged. TAPSE measures 2.25 cm and the RVS velocity measures 18.8 cm/s. IVC size is   normal (<2.1 cm) but collapses < 50% with respiration consistent with   elevated RA pressure (8 mmHg). CTA Coronary:     FINDINGS:  Phase misregistration artifact degrades the study.  This decreases  sensitivity and specificity     Origin left coronary artery is normal.  Left main coronary artery is patent.     Circumflex coronary artery contains a small amount of calcified plaque. Circumflex gives rise to patent obtuse marginal branch.  Scattered  plaque-like luminal irregularities are seen.  No flow limiting stenosis     Scattered calcified noncalcified plaque seen throughout the left anterior  descending coronary artery. , with estimated luminal narrowing of less than  20%.  No flow limiting stenosis noted.     Origin of the right coronary artery is normal.  Phase misregistration  artifact limits evaluation of the midportion of the right coronary artery. No obvious flow limiting stenosis noted with this reservation.     No aortic aneurysm identified.  No aortic valve calcification is seen.  No  central pulmonary embolus identified.  No pericardial effusion.  No  pericardial calcification.     No focal consolidation is seen on limited images of the lungs.     Low attenuation is seen in the liver, compatible with fatty infiltration     Calcium score measures 14 with a score of 4 in the LAD and a score of 10 in  the circumflex     Spurring is seen in the spine        Impression  Calcium score measures 14.  Coronary artery origins appear normal.  Scattered  plaque-like luminal irregularities are seen, with no flow limiting coronary  artery stenosis noted with the reservation that phase misregistration  artifact mildly limits the study.          Neuro/Psych:   Negative Neuro/Psych ROS              GI/Hepatic/Renal:   (+) morbid obesity     (-) liver disease and no renal disease       Endo/Other: Negative Endo/Other ROS                    Abdominal:   (+) obese,           Vascular: negative vascular ROS. Other Findings:           Anesthesia Plan      general     ASA 3     (56 yo M with PMHx of HTN, HLD, Afib, THERESA, morbid obesity presenting for afib ablation. I discussed with the patient the risks and benefits to general anesthesia including possible anesthetic complications but not limited to: PONV, damage to the airway and surrounding structures (teeth, lips, gums, tongue, etc.), adverse reactions to medications, cardiac complications (MI, CHF, arrhythmias, etc.), respiratory complications (post-op ventilation, respiratory failure, etc.), neurologic complications (nerve damage, stroke, seizure) and death. The patient was given the opportunity to ask questions and all questions were answered to the patient's satisfaction.  )  Induction: intravenous. MIPS: Prophylactic antiemetics administered. Anesthetic plan and risks discussed with patient. Plan discussed with CRNA. This pre-anesthesia assessment may be used as a history and physical.    DOS STAFF ADDENDUM:    Pt seen and examined, chart reviewed (including anesthesia, drug and allergy history). No interval changes to history and physical examination. Anesthetic plan, risks, benefits, alternatives, and personnel involved discussed with patient. Patient verbalized an understanding and agrees to proceed.       Yareli Wade MD  March 13, 2023  10:42 AM

## 2023-03-13 NOTE — DISCHARGE INSTRUCTIONS
CARDIAC ABLATION    Care of your puncture site:  Remove bandage 24 hours after the procedure. 2023 1:30 PM  May shower in 24 hours but do not sit in a bathtub/pool of water for 5 days or until the wound is healed. Gently clean groin using soap and water. Dry thoroughly and apply a Band-Aid that covers the entire site. Use Band-Aid until skin heals over in about 3-5 days. Do not apply powder or lotion. Limit walking and stair climbing today. Normal Observations:  Soreness or tenderness which may last one week. Mild oozing from the incision site. Possible bruising that could last 2 weeks. Activity:  You may resume driving 24 hours following the procedure. Do not make important / legal decisions within 24 hours after procedure. Do not drink alcoholic beverages or take any sleeping pills for 24 hours. You may resume normal activity in 3 days or after the wound heals. Avoid lifting more than 10 pounds for 3 days or until the wound heals. Avoid strenuous exercise or activity for 1 week. Nutrition:  Regular diet    Call your doctor immediately if your condition worsens, for any other concerns, for a follow-up appointment or if you experience any of the followin761 [phone]  Increased swelling on the groin or leg. Unusual pain, numbness, or tingling of the groin or down the leg. Any signs of infection such as: redness, yellow drainage at the site, swelling or pain.     IF GROIN STARTS BLEEDING SIGNIFICANTLY:   LAY FLAT, HOLD FIRM DIRECT PRESSURE, AND CALL 911

## 2023-03-13 NOTE — PROCEDURES
[address]     Electrophysiology Procedure Note       Date of Procedure: 3/13/2023  Patient's Name: Horace Yoder  YOB: 1970   Medical Record Number: [id number]  Referring Physician: Carolann Mathew MD   Procedure Performed by: Hoang Waller MD    Procedure performed:  Comprehensive electrophysiological study with attempted induction of arrhythmia at baseline. Attempted induction of arrhythmia after IV drug infusion. Three-dimensional electroanatomic mapping of the right atrium  Left atrial recording and mapping via coronary sinus   Sedation was provided by anesthesiologist.   Radiofrequency ablation of  Cavo tricuspid isthmus dependent atrial flutter  Radiofrequency ablation of AVNRT   Intracardiac echo    Mallampati3  ASA 3    Indications for procedure: Atrial flutter   Eran Griffiths 47 y.o. male  who has presented to the [facility] with shortness of breath. Patient noted to be in atrial flutter. He has had palpitations in the past suggestive of atrial flutter . Due to his risk of recurrence, options were discussed and he preferred to proceed with ablation    Details of procedure: The risks, benefits and alternatives of the ablation procedure were discussed with the patient. The risks including, but not limited to, the risks of bleeding, infection, radiation exposure, injury to vascular, cardiac and surrounding structures (including pneumothorax), stroke, cardiac perforation, tamponade, need for emergent open heart surgery, need for pacemaker implantation, myocardial infarction and death were discussed in detail. The patient opted to proceed with the ablation. Written informed consent was signed and placed in the chart. The patient was brought to the electrophysiology lab in a fasting nonsedated state. The patient was prepped and draped in a sterile fashion. A timeout protocol was completed to identify the patient and the procedure being performed.  Pre-sedation evaluation and airway assessment as well as sedation was provided by our anesthesia colleagues. After injection of 2% lidocaine in the right groin, right femoral vein access was obtained using modified seldinger technique without difficulty. Ultrasound was used for femoral venous access. We gained access to right femoral vein using modified Seldinger technique and ultrasound guidance. The femoral vein was identified with real time visualization of needle passage to the venous lumen. A 6Fr sheath for CS catheter, long 8 Fr sheath for intracardiac echocardiogram catheter and an Agilis sheath for ablation catheter were introduced to the right femoral vein. Procedure was done without fluoroscopy  Using 3-D mapping system and ICE, we advanced ICE catheter in RA through 8 Fr sheath and  then we advanced a decapolar catheter into the coronary sinus so the distal poles were in the coronary sinus for left atrial recording and mapping. Then we advanced an irrigated ablation catheter through the Agilis sheath which was placed into the right atrium. See patient's flutter EKG scanned in media showing typical atrial flutter. Using three dimensional electroanatomical mapping of the cavotricuspid isthmus which was created by [facility] navigation system, an irrigated Smart touch [employer] SF ablation cathter was advanced into position along the ventricular septal aspect of the cavotricuspid isthmus under fluoroscopic guidance and 3-D mapping. ICE showed a distal pouch in the CTI line and geometry was created and therefore catheter location and contact was monitored with ICE contour. Radiofrequency lesions were delivered in a linear fashion until the tachycardia terminated. Then while maintaining pacing from the proximal coronary sinus, additional lesions were delivered to the isthmus until bidirectional block was observed.   Bidirectional block was confirmed by noting split potentials along the ablation line (> 100 ms). Differential pacing from medial and lateral side of the line also confirmed bidirectional block. Then we did our electrophysiologic studies, programmed stimulation, by using our CS catheter in addition to our ablation catheter which was place into the right atrium and right ventricle sequentially. We also attempted to induce arrhythmia by programmed stimulation and burst pacing. Sinus node cycle length 941 ms   QRS interval 103 ms   A-H interval: 65 msec   H-V interval of 37 msec . 1:1 antegrade conduction demonstrating a fast pathway ERP of 500/320 msec with 2-3 echos. SP /290 ms. VA   VAERP 600/280 ms    Following ablation, programmed stimulation and burst pacing was performed in the right atrium in an attempt to induce any arrhythmia. A proximal to distal CS tachycardia was induced. The induced tachyarrhythmia had a cycle length (TCL) of 340msec. The VA time during the tachycardia was 0 ms. Ventricular entrainment was performed and demonstrated a VAHV response with a long return cycle length of 524 msec (> 115ms + the TCL). In addition, the difference in the stim-atrial EGM time during ventricular entrainment and VA time during SVT was > 85ms. 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 Mapping and Ablation: Then three-dimensional mapping system (Carto navigation system)  was used and a 3D electroanatomical map of the right atrium including His bundle and CS location was created. Right femoral sheath was exchanged to a long SRO sheath. An irrigated catheter was advanced into position along the septal aspect of the tricuspid valve under  3D mapping guidance. Electrophysiologic mapping was performed to localize an area on the anterior lip of the coronary sinus ostium where an atrial-ventricular ration of more than 1:3 could be obtained.   Following identification of this area, radiofrequency lesions were delivered at 28 W  with demonstration of accelerated junctional rhythm with maintenance of antegrade and retrograde conduction. This resulted in successful ablation of slow pathway which was later confirmed by lack of AH jump. We also tried to induced the tachycardia by atrial and ventricular extra-stimuli which showed the disappearance of AH jump and no re-entrant tachycardia was induced. IV drug infusion was given(Isoproterenol at 2) and programmed stim and burst pacing was repeated. No other sustained arrhythmia was induced. Then catheters were removed. Sheaths were removed and hemostasis achieved by perclose closure devices. The patient tolerated the procedure well and there were no complications. Post-sedation evaluation was completed. Patient was transported to the holding area in stable condition. Blood loss <55 cc  No complication    Conclusion:  Successful ablation of cavotricuspid isthmus dependant atrial flutter  Successful ablation of AVNRT. Plan:   Patient will go back to recovery area. Patient needs continuing anticoagulation until follow up with me in clinic. In 1 month.        Salvador Julien MD,   Cardiac Electrophysiology  [address], [address] [place] [address]  [phone]    Atrial flutter ablation      AVNRT

## 2023-03-13 NOTE — H&P
H&P Update    I have reviewed the history and physical from 3/3/2023 and examined the patient and find no relevant changes. I have reviewed with the patient and/or family the risks, benefits, and alternatives to the procedure. Pre-sedation Assessment    Patient:  Kely Mejia   :   1970    Intended Procedure: right flutter ablation with possible SVT ablation. Nurses notes reviewed and agreed.   Medications reviewed  Allergies: No Known Allergies    Pre-Procedure Assessment/Plan:  ASA 3 - Patient with moderate systemic disease with functional limitations    Level of Sedation Plan: per anesthesia    Post Procedure plan: Return to same level of care    Luis Duron MD  Cardiac Electrophysiology  [facility]

## 2023-03-13 NOTE — PROGRESS NOTES
Pt sleeping at intervals. Wakes easily. States back pain still 6/10 and tolerable.   To cath lab pre post.

## 2023-03-14 LAB
EKG ATRIAL RATE: 75 BPM
EKG DIAGNOSIS: NORMAL
EKG P AXIS: 39 DEGREES
EKG P-R INTERVAL: 152 MS
EKG Q-T INTERVAL: 406 MS
EKG QRS DURATION: 104 MS
EKG QTC CALCULATION (BAZETT): 453 MS
EKG R AXIS: -7 DEGREES
EKG T AXIS: 22 DEGREES
EKG VENTRICULAR RATE: 75 BPM

## 2023-03-14 NOTE — ANESTHESIA POSTPROCEDURE EVALUATION
Department of Anesthesiology  Postprocedure Note    Patient: Joey Cruz  MRN: [id number]  YOB: 1970  Date of evaluation: 3/14/2023      Procedure Summary     Date: 03/13/23 Room / Location: [facility] Cath Lab; [facility] Echo    Anesthesia Start: 1153 Anesthesia Stop: 6451    Procedure: WST IMAN AFIB ABLATION W ANES Diagnosis:     Scheduled Providers:  Responsible Provider: Elke Talavera MD    Anesthesia Type: General ASA Status: 3          Anesthesia Type: General    Joseph Phase I: Joseph Score: 8    Joseph Phase II:        Anesthesia Post Evaluation    Patient location during evaluation: PACU  Patient participation: complete - patient participated  Level of consciousness: awake  Airway patency: patent  Nausea & Vomiting: no nausea and no vomiting  Cardiovascular status: blood pressure returned to baseline  Respiratory status: acceptable  Hydration status: stable  Comments: Vital signs stable  OK to discharge from Stage I post anesthesia care.   Care transferred from Anesthesiology department on discharge from perioperative area   Multimodal analgesia pain management approach

## 2023-03-16 ENCOUNTER — TELEPHONE (OUTPATIENT)
Dept: CARDIOLOGY CLINIC | Age: 53
End: 2023-03-16

## 2023-03-16 NOTE — TELEPHONE ENCOUNTER
Spoke with patient he reports SBP running 120's and DBP in the 50's. He asked if he should continue Metoprolol advised he can stop and if SBP becomes elevated contact PCP. Verbalized understanding.

## 2023-03-16 NOTE — TELEPHONE ENCOUNTER
Pt called had an ablation on Monday. Wants to discuss his METOPROLOL TARTRATE.      Martin [place] # [phone]

## 2023-03-17 ENCOUNTER — TELEPHONE (OUTPATIENT)
Dept: CARDIOLOGY CLINIC | Age: 53
End: 2023-03-17

## 2023-03-17 NOTE — TELEPHONE ENCOUNTER
Spoke with patient advised small amount of bleeding is ok, but to monitor the site for redness, swelling, pain,increase in bloody drainage bloody or any purulent drainage from site instructed to call office if he develops any of these symptoms. Verbalized understanding.

## 2023-03-17 NOTE — TELEPHONE ENCOUNTER
Rubén Cedeno called in this morning, he had an ablation on 3/13 he states he has a pencil size blood rip on the band aid and he wants to know if that's normal, he is having minimal pain. He can be reached at [phone].

## 2023-04-19 NOTE — PROGRESS NOTES
Cardiac Electrophysiology   Date: 4/21/2023   Reason for Consultation: SVT  Consult Requesting Physician: Leila Tejeda MD  Primary Care Physician: Bo Fernandez MD    Chief Complaint:   Chief Complaint   Patient presents with    Follow-up     Ablation follow up. HPI: Sneha Young is a 47 y.o. patient with a history of HLD, HTN, sleep apnea. He presented to [facility] ED on 5/21/2022 via EMS after he began experiencing symptoms of palpitations and heart racing at home. EMS had him perform vagal maneuvers and he converted to NSR. We have requested tracing for EKG completed by EMS as well as note from the transport. He was first seen in office on 05/27/2022 referred by his primary cardiologist Dr. Frederick Cleaning for evaluation of SVT. He recalled the story that brought him the the ED. He states he was coloring his wife's hair and when he got done, his heart starting pounding. He states that was the first episode he experienced. He took an ASA and called 911 and was found to have a HR of 200. His wife got their neighbor who has SVT hx and ablation who had the patient perform vagal maneuvers. It terminated the fast rhythm while enroute to the ED. Treatment options dicussed. Patient chose conservative treatment with use of BB and vagal maneuvers to terminate the episodes of SVT. He was seen in office on  01/25/2023 by Ruth Jain MD and reported reoccurrence of SVT on 01/24/2022 with symptoms of palpitations and heart racing. Reported symptoms resolved with valsalva maneuver but reports feeling fatigued and weak the rest of the day. He presented to [facility] ED on 02/04/2023 reporting symptoms of palpitations. Diagnosed with atrial fibrillation /atrial flutter but review of the ECGs shows atrial flutter, not atrial fibrillation.  He was given Metoprolol bolus x 3 and converted to NSR Started on Eliquis 5 mg BID.    03/03/23 Recently diagnosed with atrial flutter and felt different than when he had

## 2023-04-21 ENCOUNTER — OFFICE VISIT (OUTPATIENT)
Dept: CARDIOLOGY CLINIC | Age: 53
End: 2023-04-21
Payer: COMMERCIAL

## 2023-04-21 VITALS
DIASTOLIC BLOOD PRESSURE: 74 MMHG | HEART RATE: 60 BPM | SYSTOLIC BLOOD PRESSURE: 110 MMHG | HEIGHT: 70 IN | WEIGHT: 300 LBS | OXYGEN SATURATION: 97 % | BODY MASS INDEX: 42.95 KG/M2

## 2023-04-21 DIAGNOSIS — I48.0 PAF (PAROXYSMAL ATRIAL FIBRILLATION) (HCC): ICD-10-CM

## 2023-04-21 DIAGNOSIS — I47.1 PSVT (PAROXYSMAL SUPRAVENTRICULAR TACHYCARDIA) (HCC): Primary | ICD-10-CM

## 2023-04-21 DIAGNOSIS — I25.10 CORONARY ARTERY DISEASE INVOLVING NATIVE CORONARY ARTERY OF NATIVE HEART WITHOUT ANGINA PECTORIS: ICD-10-CM

## 2023-04-21 DIAGNOSIS — E66.01 MORBID OBESITY (HCC): ICD-10-CM

## 2023-04-21 DIAGNOSIS — I48.3 TYPICAL ATRIAL FLUTTER (HCC): ICD-10-CM

## 2023-04-21 DIAGNOSIS — G47.33 OSA (OBSTRUCTIVE SLEEP APNEA): ICD-10-CM

## 2023-04-21 DIAGNOSIS — I10 ESSENTIAL HYPERTENSION: ICD-10-CM

## 2023-04-21 PROCEDURE — 3078F DIAST BP <80 MM HG: CPT | Performed by: INTERNAL MEDICINE

## 2023-04-21 PROCEDURE — 3017F COLORECTAL CA SCREEN DOC REV: CPT | Performed by: INTERNAL MEDICINE

## 2023-04-21 PROCEDURE — 99215 OFFICE O/P EST HI 40 MIN: CPT | Performed by: INTERNAL MEDICINE

## 2023-04-21 PROCEDURE — 1036F TOBACCO NON-USER: CPT | Performed by: INTERNAL MEDICINE

## 2023-04-21 PROCEDURE — G8427 DOCREV CUR MEDS BY ELIG CLIN: HCPCS | Performed by: INTERNAL MEDICINE

## 2023-04-21 PROCEDURE — 93000 ELECTROCARDIOGRAM COMPLETE: CPT | Performed by: INTERNAL MEDICINE

## 2023-04-21 PROCEDURE — 3074F SYST BP LT 130 MM HG: CPT | Performed by: INTERNAL MEDICINE

## 2023-04-21 PROCEDURE — G8417 CALC BMI ABV UP PARAM F/U: HCPCS | Performed by: INTERNAL MEDICINE

## 2023-06-19 ENCOUNTER — TELEPHONE (OUTPATIENT)
Dept: CARDIOLOGY CLINIC | Age: 53
End: 2023-06-19

## 2023-06-19 NOTE — TELEPHONE ENCOUNTER
Please reach out to patient and advise no need to take metoprolol for short lived episodes. If has sustained episode during office hours would want him to come into to office for EKG to check his heart rhythm.

## 2023-06-19 NOTE — TELEPHONE ENCOUNTER
Paulette Harrison called the office today after having a tachycardia episode over the weekend. He stated that it was the first one since his procedure back in March. Paulette Harrison wants to know if he should take his metoprolol medication if he is to experience another episode?        Paulette Hunter' callback: [phone]

## 2023-06-19 NOTE — TELEPHONE ENCOUNTER
Called spoke with patient-states understanding no need to take the metoprolol for short lived episodes.  Patient states he hasn't had any more episode, if he does he will call office to come in for an EKG

## 2023-06-19 NOTE — TELEPHONE ENCOUNTER
Please reach out to patient and ask how long his tachycardic episode lasted and how high his heart rate was at the time of the episode.

## 2023-06-26 ENCOUNTER — TELEPHONE (OUTPATIENT)
Dept: CARDIOLOGY CLINIC | Age: 53
End: 2023-06-26

## 2023-06-26 ENCOUNTER — OFFICE VISIT (OUTPATIENT)
Dept: CARDIOLOGY CLINIC | Age: 53
End: 2023-06-26
Payer: COMMERCIAL

## 2023-06-26 VITALS
SYSTOLIC BLOOD PRESSURE: 110 MMHG | HEART RATE: 69 BPM | BODY MASS INDEX: 40.94 KG/M2 | DIASTOLIC BLOOD PRESSURE: 70 MMHG | OXYGEN SATURATION: 97 % | HEIGHT: 70 IN | WEIGHT: 286 LBS

## 2023-06-26 DIAGNOSIS — I47.1 PSVT (PAROXYSMAL SUPRAVENTRICULAR TACHYCARDIA) (HCC): Primary | ICD-10-CM

## 2023-06-26 DIAGNOSIS — I10 ESSENTIAL HYPERTENSION: ICD-10-CM

## 2023-06-26 DIAGNOSIS — I48.3 TYPICAL ATRIAL FLUTTER (HCC): ICD-10-CM

## 2023-06-26 DIAGNOSIS — I25.10 CORONARY ARTERY DISEASE INVOLVING NATIVE CORONARY ARTERY OF NATIVE HEART WITHOUT ANGINA PECTORIS: ICD-10-CM

## 2023-06-26 PROCEDURE — 3017F COLORECTAL CA SCREEN DOC REV: CPT | Performed by: NURSE PRACTITIONER

## 2023-06-26 PROCEDURE — 3074F SYST BP LT 130 MM HG: CPT | Performed by: NURSE PRACTITIONER

## 2023-06-26 PROCEDURE — 3078F DIAST BP <80 MM HG: CPT | Performed by: NURSE PRACTITIONER

## 2023-06-26 PROCEDURE — G8427 DOCREV CUR MEDS BY ELIG CLIN: HCPCS | Performed by: NURSE PRACTITIONER

## 2023-06-26 PROCEDURE — G8417 CALC BMI ABV UP PARAM F/U: HCPCS | Performed by: NURSE PRACTITIONER

## 2023-06-26 PROCEDURE — 93000 ELECTROCARDIOGRAM COMPLETE: CPT | Performed by: NURSE PRACTITIONER

## 2023-06-26 PROCEDURE — 99214 OFFICE O/P EST MOD 30 MIN: CPT | Performed by: NURSE PRACTITIONER

## 2023-06-26 PROCEDURE — 1036F TOBACCO NON-USER: CPT | Performed by: NURSE PRACTITIONER

## 2023-06-26 RX ORDER — HYDROCHLOROTHIAZIDE 25 MG/1
25 TABLET ORAL DAILY
Qty: 30 TABLET | Refills: 3 | Status: SHIPPED | OUTPATIENT
Start: 2023-06-26

## 2023-06-26 ASSESSMENT — ENCOUNTER SYMPTOMS
WHEEZING: 0
NAUSEA: 0
SORE THROAT: 0
BACK PAIN: 0
VOMITING: 0
SINUS PRESSURE: 0
COLOR CHANGE: 0
SHORTNESS OF BREATH: 0
TROUBLE SWALLOWING: 0
CONSTIPATION: 0
ABDOMINAL PAIN: 0
BLOOD IN STOOL: 0
COUGH: 0
DIARRHEA: 0

## 2023-07-06 NOTE — TELEPHONE ENCOUNTER
ANA:  Called patient. He said that he wore the monitors for almost two weeks. Confirmed with Beulah SMITH, OK for patient to discontinue monitor and send back to [facility]. Patient verbalized and confirmed understanding.

## 2023-07-06 NOTE — TELEPHONE ENCOUNTER
Katherine Arriaga called in this afternoon, he states his patches are not sticking well due to him sweating a lot, he also states under his patches it is very red, there are bumps on his skin and he also has burning sensation     Eran can be reached at [phone]

## 2023-08-02 PROCEDURE — 93228 REMOTE 30 DAY ECG REV/REPORT: CPT | Performed by: NURSE PRACTITIONER

## 2023-08-10 DIAGNOSIS — I47.1 PSVT (PAROXYSMAL SUPRAVENTRICULAR TACHYCARDIA) (HCC): ICD-10-CM

## 2023-08-10 DIAGNOSIS — I47.1 PAROXYSMAL SUPRAVENTRICULAR TACHYCARDIA (HCC): Primary | ICD-10-CM

## 2023-08-10 DIAGNOSIS — I48.3 TYPICAL ATRIAL FLUTTER (HCC): ICD-10-CM

## 2023-08-25 NOTE — PROGRESS NOTES
Abdominal:      General: Abdomen is flat. Bowel sounds are normal.      Palpations: Abdomen is soft. Musculoskeletal:         General: Normal range of motion. Right lower leg: No edema. Left lower leg: No edema. Skin:     General: Skin is warm and dry. Findings: No bruising. Neurological:      General: No focal deficit present. Mental Status: He is alert and oriented to person, place, and time. Motor: No weakness. Psychiatric:         Mood and Affect: Mood normal.         Behavior: Behavior normal.        Pertinent labs, diagnostic, device, and imaging results reviewed as a part of this visit    LABS    CBC:   Lab Results   Component Value Date    WBC 9.6 2023    HGB 15.7 2023    HCT 46.1 2023    MCV 88.7 2023     2023     BMP:   Lab Results   Component Value Date    CREATININE 0.7 (L) 2023    BUN 18 2023     2023    K 3.8 2023     2023    CO2 23 2023     Estimated Creatinine Clearance: 171 mL/min (A) (based on SCr of 0.7 mg/dL (L)). No results found for: BNP    Thyroid: No results found for: TSH, R2CWLQO, F5EPEYD, THYROIDAB  Lipid Panel: No results found for: CHOL, HDL, TRIG  LFTs:  Lab Results   Component Value Date     (H) 2022    AST 75 (H) 2022    ALKPHOS 129 2022    BILITOT 0.6 2022     Coags: No results found for: PROTIME, INR, APTT    EC2023   SR at 70 BPM.    Echo: 22   Left ventricular cavity size is normal with mild concentric left ventricular   hypertrophy. Overall left ventricular systolic function appears normal with an ejection   fraction of 55-60. No regional wall motion abnormalities   Normal diastolic function. Estimated pulmonary artery systolic pressure is at 28 mmHg assuming a right   atrial pressure of 8 mmHg. The right ventricle is enlarged. TAPSE measures 2.25 cm and the RVS velocity measures 18.8 cm/s.  IVC size is

## 2023-08-28 ENCOUNTER — OFFICE VISIT (OUTPATIENT)
Dept: CARDIOLOGY CLINIC | Age: 53
End: 2023-08-28
Payer: COMMERCIAL

## 2023-08-28 VITALS
DIASTOLIC BLOOD PRESSURE: 64 MMHG | OXYGEN SATURATION: 96 % | HEART RATE: 78 BPM | WEIGHT: 293 LBS | SYSTOLIC BLOOD PRESSURE: 112 MMHG | BODY MASS INDEX: 41.95 KG/M2 | HEIGHT: 70 IN

## 2023-08-28 DIAGNOSIS — I48.3 TYPICAL ATRIAL FLUTTER (HCC): ICD-10-CM

## 2023-08-28 DIAGNOSIS — I47.1 PSVT (PAROXYSMAL SUPRAVENTRICULAR TACHYCARDIA) (HCC): Primary | ICD-10-CM

## 2023-08-28 DIAGNOSIS — I25.10 CORONARY ARTERY DISEASE INVOLVING NATIVE CORONARY ARTERY OF NATIVE HEART WITHOUT ANGINA PECTORIS: ICD-10-CM

## 2023-08-28 DIAGNOSIS — I10 ESSENTIAL HYPERTENSION: ICD-10-CM

## 2023-08-28 PROCEDURE — G8427 DOCREV CUR MEDS BY ELIG CLIN: HCPCS | Performed by: NURSE PRACTITIONER

## 2023-08-28 PROCEDURE — 93000 ELECTROCARDIOGRAM COMPLETE: CPT | Performed by: NURSE PRACTITIONER

## 2023-08-28 PROCEDURE — 3074F SYST BP LT 130 MM HG: CPT | Performed by: NURSE PRACTITIONER

## 2023-08-28 PROCEDURE — 3078F DIAST BP <80 MM HG: CPT | Performed by: NURSE PRACTITIONER

## 2023-08-28 PROCEDURE — 1036F TOBACCO NON-USER: CPT | Performed by: NURSE PRACTITIONER

## 2023-08-28 PROCEDURE — G8417 CALC BMI ABV UP PARAM F/U: HCPCS | Performed by: NURSE PRACTITIONER

## 2023-08-28 PROCEDURE — 99214 OFFICE O/P EST MOD 30 MIN: CPT | Performed by: NURSE PRACTITIONER

## 2023-08-28 PROCEDURE — 3017F COLORECTAL CA SCREEN DOC REV: CPT | Performed by: NURSE PRACTITIONER

## 2023-08-28 ASSESSMENT — ENCOUNTER SYMPTOMS
SHORTNESS OF BREATH: 0
BACK PAIN: 0
TROUBLE SWALLOWING: 0
CONSTIPATION: 0
COLOR CHANGE: 0
BLOOD IN STOOL: 0
COUGH: 0
ABDOMINAL PAIN: 0
DIARRHEA: 0
NAUSEA: 0
VOMITING: 0
WHEEZING: 0
SORE THROAT: 0
SINUS PRESSURE: 0

## 2023-11-01 RX ORDER — ATORVASTATIN CALCIUM 40 MG/1
TABLET, FILM COATED ORAL
Qty: 90 TABLET | Refills: 3 | Status: SHIPPED | OUTPATIENT
Start: 2023-11-01

## 2023-11-01 NOTE — TELEPHONE ENCOUNTER
Last OV: 8/28/23  Next OV: X due yearly  Last refill: 11/10/22  #90  2 R/F  Most recent Labs: 2/4/23 care everywhere, 3/8/23  Last EKG (if needed): 8/28/23

## 2024-01-03 ENCOUNTER — APPOINTMENT (OUTPATIENT)
Dept: GENERAL RADIOLOGY | Age: 54
DRG: 048 | End: 2024-01-03
Payer: COMMERCIAL

## 2024-01-03 ENCOUNTER — HOSPITAL ENCOUNTER (INPATIENT)
Age: 54
LOS: 1 days | Discharge: HOME | DRG: 048 | End: 2024-01-04
Attending: EMERGENCY MEDICINE | Admitting: HOSPITALIST
Payer: COMMERCIAL

## 2024-01-03 ENCOUNTER — APPOINTMENT (OUTPATIENT)
Dept: CT IMAGING | Age: 54
DRG: 048 | End: 2024-01-03
Payer: COMMERCIAL

## 2024-01-03 DIAGNOSIS — R29.810 WEAKNESS ON LEFT SIDE OF FACE: Primary | ICD-10-CM

## 2024-01-03 DIAGNOSIS — G51.0 LEFT-SIDED BELL'S PALSY: ICD-10-CM

## 2024-01-03 DIAGNOSIS — G45.9 TIA (TRANSIENT ISCHEMIC ATTACK): ICD-10-CM

## 2024-01-03 PROBLEM — R29.90 STROKE-LIKE SYMPTOMS: Status: ACTIVE | Noted: 2024-01-03

## 2024-01-03 LAB
ALBUMIN SERPL-MCNC: 4.9 G/DL (ref 3.4–5)
ALBUMIN/GLOB SERPL: 1.8 {RATIO} (ref 1.1–2.2)
ALP SERPL-CCNC: 91 U/L (ref 40–129)
ALT SERPL-CCNC: 28 U/L (ref 10–40)
ANION GAP SERPL CALCULATED.3IONS-SCNC: 10 MMOL/L (ref 3–16)
AST SERPL-CCNC: 18 U/L (ref 15–37)
BASOPHILS # BLD: 0.1 K/UL (ref 0–0.2)
BASOPHILS NFR BLD: 0.6 %
BILIRUB SERPL-MCNC: 1.1 MG/DL (ref 0–1)
BUN SERPL-MCNC: 10 MG/DL (ref 7–20)
CALCIUM SERPL-MCNC: 9.7 MG/DL (ref 8.3–10.6)
CHLORIDE SERPL-SCNC: 102 MMOL/L (ref 99–110)
CHOLEST SERPL-MCNC: 102 MG/DL (ref 0–199)
CO2 SERPL-SCNC: 28 MMOL/L (ref 21–32)
CREAT SERPL-MCNC: 0.6 MG/DL (ref 0.9–1.3)
DEPRECATED RDW RBC AUTO: 13.3 % (ref 12.4–15.4)
EOSINOPHIL # BLD: 0.1 K/UL (ref 0–0.6)
EOSINOPHIL NFR BLD: 1.1 %
GFR SERPLBLD CREATININE-BSD FMLA CKD-EPI: >60 ML/MIN/{1.73_M2}
GLUCOSE SERPL-MCNC: 118 MG/DL (ref 70–99)
HCT VFR BLD AUTO: 50.3 % (ref 40.5–52.5)
HDLC SERPL-MCNC: 35 MG/DL (ref 40–60)
HGB BLD-MCNC: 17.4 G/DL (ref 13.5–17.5)
INR PPP: 1.01 (ref 0.84–1.16)
LDLC SERPL CALC-MCNC: 52 MG/DL
LYMPHOCYTES # BLD: 1.9 K/UL (ref 1–5.1)
LYMPHOCYTES NFR BLD: 21.3 %
MCH RBC QN AUTO: 30.6 PG (ref 26–34)
MCHC RBC AUTO-ENTMCNC: 34.5 G/DL (ref 31–36)
MCV RBC AUTO: 88.6 FL (ref 80–100)
MONOCYTES # BLD: 0.5 K/UL (ref 0–1.3)
MONOCYTES NFR BLD: 5.5 %
NEUTROPHILS # BLD: 6.5 K/UL (ref 1.7–7.7)
NEUTROPHILS NFR BLD: 71.5 %
PLATELET # BLD AUTO: 259 K/UL (ref 135–450)
PMV BLD AUTO: 7.5 FL (ref 5–10.5)
POTASSIUM SERPL-SCNC: 4.2 MMOL/L (ref 3.5–5.1)
PROT SERPL-MCNC: 7.6 G/DL (ref 6.4–8.2)
PROTHROMBIN TIME: 13.3 SEC (ref 11.5–14.8)
RBC # BLD AUTO: 5.68 M/UL (ref 4.2–5.9)
SODIUM SERPL-SCNC: 140 MMOL/L (ref 136–145)
TRIGL SERPL-MCNC: 76 MG/DL (ref 0–150)
TROPONIN, HIGH SENSITIVITY: 10 NG/L (ref 0–22)
TROPONIN, HIGH SENSITIVITY: 9 NG/L (ref 0–22)
VLDLC SERPL CALC-MCNC: 15 MG/DL
WBC # BLD AUTO: 9.1 K/UL (ref 4–11)

## 2024-01-03 PROCEDURE — 99285 EMERGENCY DEPT VISIT HI MDM: CPT

## 2024-01-03 PROCEDURE — 6370000000 HC RX 637 (ALT 250 FOR IP): Performed by: EMERGENCY MEDICINE

## 2024-01-03 PROCEDURE — 85610 PROTHROMBIN TIME: CPT

## 2024-01-03 PROCEDURE — 2580000003 HC RX 258: Performed by: HOSPITALIST

## 2024-01-03 PROCEDURE — 70450 CT HEAD/BRAIN W/O DYE: CPT

## 2024-01-03 PROCEDURE — 84484 ASSAY OF TROPONIN QUANT: CPT

## 2024-01-03 PROCEDURE — 85025 COMPLETE CBC W/AUTO DIFF WBC: CPT

## 2024-01-03 PROCEDURE — 6360000004 HC RX CONTRAST MEDICATION: Performed by: EMERGENCY MEDICINE

## 2024-01-03 PROCEDURE — 80053 COMPREHEN METABOLIC PANEL: CPT

## 2024-01-03 PROCEDURE — 1200000000 HC SEMI PRIVATE

## 2024-01-03 PROCEDURE — 36415 COLL VENOUS BLD VENIPUNCTURE: CPT

## 2024-01-03 PROCEDURE — 93005 ELECTROCARDIOGRAM TRACING: CPT | Performed by: EMERGENCY MEDICINE

## 2024-01-03 PROCEDURE — 80061 LIPID PANEL: CPT

## 2024-01-03 PROCEDURE — 6360000002 HC RX W HCPCS: Performed by: HOSPITALIST

## 2024-01-03 PROCEDURE — 71045 X-RAY EXAM CHEST 1 VIEW: CPT

## 2024-01-03 PROCEDURE — 6370000000 HC RX 637 (ALT 250 FOR IP): Performed by: HOSPITALIST

## 2024-01-03 PROCEDURE — 70498 CT ANGIOGRAPHY NECK: CPT

## 2024-01-03 RX ORDER — SODIUM CHLORIDE 9 MG/ML
INJECTION, SOLUTION INTRAVENOUS PRN
Status: DISCONTINUED | OUTPATIENT
Start: 2024-01-03 | End: 2024-01-04 | Stop reason: HOSPADM

## 2024-01-03 RX ORDER — POTASSIUM CHLORIDE 7.45 MG/ML
10 INJECTION INTRAVENOUS PRN
Status: DISCONTINUED | OUTPATIENT
Start: 2024-01-03 | End: 2024-01-04 | Stop reason: HOSPADM

## 2024-01-03 RX ORDER — PREDNISONE 20 MG/1
60 TABLET ORAL ONCE
Status: COMPLETED | OUTPATIENT
Start: 2024-01-03 | End: 2024-01-03

## 2024-01-03 RX ORDER — ATORVASTATIN CALCIUM 40 MG/1
40 TABLET, FILM COATED ORAL NIGHTLY
Status: DISCONTINUED | OUTPATIENT
Start: 2024-01-03 | End: 2024-01-04 | Stop reason: HOSPADM

## 2024-01-03 RX ORDER — SODIUM CHLORIDE 0.9 % (FLUSH) 0.9 %
5-40 SYRINGE (ML) INJECTION PRN
Status: DISCONTINUED | OUTPATIENT
Start: 2024-01-03 | End: 2024-01-04 | Stop reason: HOSPADM

## 2024-01-03 RX ORDER — ENOXAPARIN SODIUM 100 MG/ML
30 INJECTION SUBCUTANEOUS 2 TIMES DAILY
Status: DISCONTINUED | OUTPATIENT
Start: 2024-01-03 | End: 2024-01-04 | Stop reason: HOSPADM

## 2024-01-03 RX ORDER — MAGNESIUM SULFATE IN WATER 40 MG/ML
2000 INJECTION, SOLUTION INTRAVENOUS PRN
Status: DISCONTINUED | OUTPATIENT
Start: 2024-01-03 | End: 2024-01-04 | Stop reason: HOSPADM

## 2024-01-03 RX ORDER — PREDNISONE 20 MG/1
60 TABLET ORAL DAILY
Status: DISCONTINUED | OUTPATIENT
Start: 2024-01-04 | End: 2024-01-04 | Stop reason: HOSPADM

## 2024-01-03 RX ORDER — ACETAMINOPHEN 500 MG
1000 TABLET ORAL ONCE
Status: COMPLETED | OUTPATIENT
Start: 2024-01-03 | End: 2024-01-03

## 2024-01-03 RX ORDER — ONDANSETRON 4 MG/1
4 TABLET, ORALLY DISINTEGRATING ORAL EVERY 8 HOURS PRN
Status: DISCONTINUED | OUTPATIENT
Start: 2024-01-03 | End: 2024-01-04 | Stop reason: HOSPADM

## 2024-01-03 RX ORDER — POLYETHYLENE GLYCOL 3350 17 G/17G
17 POWDER, FOR SOLUTION ORAL DAILY PRN
Status: DISCONTINUED | OUTPATIENT
Start: 2024-01-03 | End: 2024-01-04 | Stop reason: HOSPADM

## 2024-01-03 RX ORDER — ASPIRIN 81 MG/1
81 TABLET, CHEWABLE ORAL DAILY
Status: DISCONTINUED | OUTPATIENT
Start: 2024-01-04 | End: 2024-01-04 | Stop reason: HOSPADM

## 2024-01-03 RX ORDER — POTASSIUM CHLORIDE 20 MEQ/1
40 TABLET, EXTENDED RELEASE ORAL PRN
Status: DISCONTINUED | OUTPATIENT
Start: 2024-01-03 | End: 2024-01-04 | Stop reason: HOSPADM

## 2024-01-03 RX ORDER — SODIUM CHLORIDE 0.9 % (FLUSH) 0.9 %
5-40 SYRINGE (ML) INJECTION EVERY 12 HOURS SCHEDULED
Status: DISCONTINUED | OUTPATIENT
Start: 2024-01-03 | End: 2024-01-04 | Stop reason: HOSPADM

## 2024-01-03 RX ORDER — ACETAMINOPHEN 325 MG/1
650 TABLET ORAL EVERY 6 HOURS PRN
Status: DISCONTINUED | OUTPATIENT
Start: 2024-01-03 | End: 2024-01-04 | Stop reason: HOSPADM

## 2024-01-03 RX ORDER — ACETAMINOPHEN 650 MG/1
650 SUPPOSITORY RECTAL EVERY 6 HOURS PRN
Status: DISCONTINUED | OUTPATIENT
Start: 2024-01-03 | End: 2024-01-04 | Stop reason: HOSPADM

## 2024-01-03 RX ORDER — ONDANSETRON 2 MG/ML
4 INJECTION INTRAMUSCULAR; INTRAVENOUS EVERY 6 HOURS PRN
Status: DISCONTINUED | OUTPATIENT
Start: 2024-01-03 | End: 2024-01-04 | Stop reason: HOSPADM

## 2024-01-03 RX ORDER — ACYCLOVIR 200 MG/1
400 CAPSULE ORAL ONCE
Status: COMPLETED | OUTPATIENT
Start: 2024-01-03 | End: 2024-01-03

## 2024-01-03 RX ADMIN — ACETAMINOPHEN 1000 MG: 500 TABLET ORAL at 14:08

## 2024-01-03 RX ADMIN — IOPAMIDOL 75 ML: 755 INJECTION, SOLUTION INTRAVENOUS at 14:13

## 2024-01-03 RX ADMIN — Medication 10 ML: at 22:06

## 2024-01-03 RX ADMIN — ATORVASTATIN CALCIUM 40 MG: 40 TABLET, FILM COATED ORAL at 22:02

## 2024-01-03 RX ADMIN — ACYCLOVIR 400 MG: 200 CAPSULE ORAL at 18:25

## 2024-01-03 RX ADMIN — ENOXAPARIN SODIUM 30 MG: 100 INJECTION SUBCUTANEOUS at 22:02

## 2024-01-03 RX ADMIN — PREDNISONE 60 MG: 20 TABLET ORAL at 15:31

## 2024-01-03 ASSESSMENT — PAIN DESCRIPTION - ONSET: ONSET: ON-GOING

## 2024-01-03 ASSESSMENT — PAIN - FUNCTIONAL ASSESSMENT
PAIN_FUNCTIONAL_ASSESSMENT: 0-10
PAIN_FUNCTIONAL_ASSESSMENT: PREVENTS OR INTERFERES SOME ACTIVE ACTIVITIES AND ADLS

## 2024-01-03 ASSESSMENT — PAIN DESCRIPTION - PAIN TYPE: TYPE: ACUTE PAIN

## 2024-01-03 ASSESSMENT — PAIN DESCRIPTION - LOCATION
LOCATION: HEAD
LOCATION: HEAD

## 2024-01-03 ASSESSMENT — PAIN DESCRIPTION - ORIENTATION: ORIENTATION: LEFT

## 2024-01-03 ASSESSMENT — PAIN SCALES - GENERAL
PAINLEVEL_OUTOF10: 0
PAINLEVEL_OUTOF10: 7
PAINLEVEL_OUTOF10: 7

## 2024-01-03 ASSESSMENT — PAIN DESCRIPTION - FREQUENCY: FREQUENCY: CONTINUOUS

## 2024-01-03 ASSESSMENT — PAIN DESCRIPTION - DESCRIPTORS: DESCRIPTORS: ACHING

## 2024-01-03 NOTE — ED PROVIDER NOTES
[facility] EMERGENCY DEPARTMENT    CHIEF COMPLAINT  Numbness (Pt reports had covid approx 1 week ago. Pt reports for the past 2 days, pain in the back of his head. Pt reports feeling generalized weakness over his body for the past few days. PT reports that first noticed this morning left sided facial numbness, pt states felt different when attempting to spit/eat food. Pt went to bed at 1130 last night and this presentation was not present. Noticed at 0630 this morning when he woke. MD present at triage)       HISTORY OF PRESENT ILLNESS  Eran Griffiths is a 53 y.o. male who presents to the ED left-sided facial numbness.   LKWT 11:30 PM     He reports some paresthesias in the lower left side of his face for the past couple of days.  He woke up at 6:30 AM and noted that water was running out of the left side of his mouth and that he had a left-sided facial droop.  He also feels like he cannot close his left eye all the way.  He feels like his lower face is numb \"like at the dentist after they numb you up\" also feels like his left upper extremity is \"weird\".  When clarified he feels like his left upper extremity is numb.  Denies any visual field deficit or vision loss.  No aphasia.  No lower extremity symptoms.  No recent head trauma.  He is not on anticoagulation.  He recently had COVID.     I have reviewed the following from the nursing documentation:    Past Medical History:   Diagnosis Date    Hyperlipidemia     Hypertension     Sleep apnea      Past Surgical History:   Procedure Laterality Date    ROTATOR CUFF REPAIR Right      Family History   Problem Relation Age of Onset    Other Other         nka family hx sleep apnea     Heart Attack Father     Hypertension Paternal Uncle      Social History     Socioeconomic History    Marital status:      Spouse name: Not on file    Number of children: Not on file    Years of education: Not on file    Highest education level: Not on file   Occupational

## 2024-01-03 NOTE — H&P
or \"stroke alert\" been called?->No Reason for exam:->Stroke Symptoms Decision Support Exception - unselect if not a suspected or confirmed emergency medical condition->Emergency Medical Condition (MA) Reason for Exam: paresthesias in the lower left side of his face for the past couple of days. Initial evaluation. FINDINGS: CTA NECK: AORTIC ARCH/ARCH VESSELS: No dissection or arterial injury.  No significant stenosis of the brachiocephalic or subclavian arteries. CAROTID ARTERIES: No dissection, arterial injury, or hemodynamically significant stenosis by NASCET criteria. VERTEBRAL ARTERIES: No dissection, arterial injury, or significant stenosis. SOFT TISSUES: No focal consolidation within the visualized lung apices.  No acute abnormality within the visualized superior mediastinum. BONES: No acute osseous abnormality. CTA HEAD: ANTERIOR CIRCULATION: No significant stenosis of the intracranial internal carotid, anterior cerebral, or middle cerebral arteries. No aneurysm. POSTERIOR CIRCULATION: No significant stenosis of the vertebral, basilar, or posterior cerebral arteries. No aneurysm. OTHER: No dural venous sinus thrombosis on this non-dedicated study. BRAIN: No mass effect or midline shift.     1. No flow limiting stenosis or dissection of the cervical carotid/vertebral arteries. 2. No significant stenosis or large vessel occlusion of the [id number].     CT HEAD WO CONTRAST    Result Date: 1/3/2024  EXAMINATION: CT OF THE HEAD WITHOUT CONTRAST  1/3/2024 2:08 pm TECHNIQUE: CT of the head was performed without the administration of intravenous contrast. Automated exposure control, iterative reconstruction, and/or weight based adjustment of the mA/kV was utilized to reduce the radiation dose to as low as reasonably achievable. COMPARISON: None. HISTORY: ORDERING SYSTEM PROVIDED HISTORY: Stroke Symptoms TECHNOLOGIST PROVIDED HISTORY: Has a \"code stroke\" or \"stroke alert\" been called?->No Reason for exam:->Stroke

## 2024-01-04 ENCOUNTER — APPOINTMENT (OUTPATIENT)
Dept: MRI IMAGING | Age: 54
DRG: 048 | End: 2024-01-04
Payer: COMMERCIAL

## 2024-01-04 VITALS
HEART RATE: 80 BPM | HEIGHT: 70 IN | RESPIRATION RATE: 18 BRPM | TEMPERATURE: 98 F | SYSTOLIC BLOOD PRESSURE: 134 MMHG | BODY MASS INDEX: 42.48 KG/M2 | OXYGEN SATURATION: 95 % | DIASTOLIC BLOOD PRESSURE: 75 MMHG | WEIGHT: 296.74 LBS

## 2024-01-04 LAB
ANION GAP SERPL CALCULATED.3IONS-SCNC: 10 MMOL/L (ref 3–16)
BASOPHILS # BLD: 0.1 K/UL (ref 0–0.2)
BASOPHILS NFR BLD: 0.4 %
BILIRUB UR QL STRIP.AUTO: NEGATIVE
BUN SERPL-MCNC: 11 MG/DL (ref 7–20)
CALCIUM SERPL-MCNC: 8.8 MG/DL (ref 8.3–10.6)
CHLORIDE SERPL-SCNC: 102 MMOL/L (ref 99–110)
CLARITY UR: CLEAR
CO2 SERPL-SCNC: 25 MMOL/L (ref 21–32)
COLOR UR: ABNORMAL
CREAT SERPL-MCNC: 0.5 MG/DL (ref 0.9–1.3)
DEPRECATED RDW RBC AUTO: 13 % (ref 12.4–15.4)
EKG ATRIAL RATE: 70 BPM
EKG DIAGNOSIS: NORMAL
EKG P AXIS: 26 DEGREES
EKG P-R INTERVAL: 140 MS
EKG Q-T INTERVAL: 414 MS
EKG QRS DURATION: 98 MS
EKG QTC CALCULATION (BAZETT): 447 MS
EKG R AXIS: -11 DEGREES
EKG T AXIS: 28 DEGREES
EKG VENTRICULAR RATE: 70 BPM
EOSINOPHIL # BLD: 0 K/UL (ref 0–0.6)
EOSINOPHIL NFR BLD: 0.2 %
GFR SERPLBLD CREATININE-BSD FMLA CKD-EPI: >60 ML/MIN/{1.73_M2}
GLUCOSE SERPL-MCNC: 118 MG/DL (ref 70–99)
GLUCOSE UR STRIP.AUTO-MCNC: NEGATIVE MG/DL
HCT VFR BLD AUTO: 46.8 % (ref 40.5–52.5)
HGB BLD-MCNC: 16.6 G/DL (ref 13.5–17.5)
HGB UR QL STRIP.AUTO: NEGATIVE
KETONES UR STRIP.AUTO-MCNC: NEGATIVE MG/DL
LEUKOCYTE ESTERASE UR QL STRIP.AUTO: NEGATIVE
LYMPHOCYTES # BLD: 2.4 K/UL (ref 1–5.1)
LYMPHOCYTES NFR BLD: 19.7 %
MCH RBC QN AUTO: 30.9 PG (ref 26–34)
MCHC RBC AUTO-ENTMCNC: 35.5 G/DL (ref 31–36)
MCV RBC AUTO: 87 FL (ref 80–100)
MONOCYTES # BLD: 0.9 K/UL (ref 0–1.3)
MONOCYTES NFR BLD: 7.4 %
NEUTROPHILS # BLD: 8.8 K/UL (ref 1.7–7.7)
NEUTROPHILS NFR BLD: 72.3 %
NITRITE UR QL STRIP.AUTO: NEGATIVE
PH UR STRIP.AUTO: 5 [PH] (ref 5–8)
PLATELET # BLD AUTO: 228 K/UL (ref 135–450)
PMV BLD AUTO: 7.6 FL (ref 5–10.5)
POTASSIUM SERPL-SCNC: 3.9 MMOL/L (ref 3.5–5.1)
PROT UR STRIP.AUTO-MCNC: NEGATIVE MG/DL
RBC # BLD AUTO: 5.37 M/UL (ref 4.2–5.9)
SODIUM SERPL-SCNC: 137 MMOL/L (ref 136–145)
SP GR UR STRIP.AUTO: 1.03 (ref 1–1.03)
TSH SERPL DL<=0.005 MIU/L-ACNC: 0.55 UIU/ML (ref 0.27–4.2)
UA COMPLETE W REFLEX CULTURE PNL UR: ABNORMAL
UA DIPSTICK W REFLEX MICRO PNL UR: ABNORMAL
URN SPEC COLLECT METH UR: ABNORMAL
UROBILINOGEN UR STRIP-ACNC: 1 E.U./DL
WBC # BLD AUTO: 12.3 K/UL (ref 4–11)

## 2024-01-04 PROCEDURE — C8929 TTE W OR WO FOL WCON,DOPPLER: HCPCS

## 2024-01-04 PROCEDURE — 81003 URINALYSIS AUTO W/O SCOPE: CPT

## 2024-01-04 PROCEDURE — 6360000004 HC RX CONTRAST MEDICATION: Performed by: HOSPITALIST

## 2024-01-04 PROCEDURE — 92610 EVALUATE SWALLOWING FUNCTION: CPT

## 2024-01-04 PROCEDURE — 6370000000 HC RX 637 (ALT 250 FOR IP): Performed by: HOSPITALIST

## 2024-01-04 PROCEDURE — 93010 ELECTROCARDIOGRAM REPORT: CPT | Performed by: INTERNAL MEDICINE

## 2024-01-04 PROCEDURE — 80048 BASIC METABOLIC PNL TOTAL CA: CPT

## 2024-01-04 PROCEDURE — 85025 COMPLETE CBC W/AUTO DIFF WBC: CPT

## 2024-01-04 PROCEDURE — 9990000010 HC NO CHARGE VISIT: Performed by: PHYSICAL THERAPIST

## 2024-01-04 PROCEDURE — 6370000000 HC RX 637 (ALT 250 FOR IP): Performed by: NURSE PRACTITIONER

## 2024-01-04 PROCEDURE — 70551 MRI BRAIN STEM W/O DYE: CPT

## 2024-01-04 PROCEDURE — 36415 COLL VENOUS BLD VENIPUNCTURE: CPT

## 2024-01-04 PROCEDURE — 94760 N-INVAS EAR/PLS OXIMETRY 1: CPT

## 2024-01-04 PROCEDURE — 6360000002 HC RX W HCPCS: Performed by: HOSPITALIST

## 2024-01-04 PROCEDURE — 6370000000 HC RX 637 (ALT 250 FOR IP): Performed by: FAMILY MEDICINE

## 2024-01-04 PROCEDURE — 2580000003 HC RX 258: Performed by: HOSPITALIST

## 2024-01-04 PROCEDURE — 84443 ASSAY THYROID STIM HORMONE: CPT

## 2024-01-04 RX ORDER — PREDNISONE 20 MG/1
10 TABLET ORAL DAILY
Qty: 1 TABLET | Refills: 0 | Status: SHIPPED | OUTPATIENT
Start: 2024-01-15 | End: 2024-01-16

## 2024-01-04 RX ORDER — PREDNISONE 5 MG/1
30 TABLET ORAL DAILY
Qty: 6 TABLET | Refills: 0 | Status: SHIPPED | OUTPATIENT
Start: 2024-01-12 | End: 2024-01-13

## 2024-01-04 RX ORDER — PREDNISONE 20 MG/1
20 TABLET ORAL DAILY
Qty: 1 TABLET | Refills: 0 | Status: SHIPPED | OUTPATIENT
Start: 2024-01-14 | End: 2024-01-15

## 2024-01-04 RX ORDER — PREDNISONE 10 MG/1
50 TABLET ORAL DAILY
Qty: 5 TABLET | Refills: 0 | Status: SHIPPED | OUTPATIENT
Start: 2024-01-10 | End: 2024-01-11

## 2024-01-04 RX ORDER — ACYCLOVIR 200 MG/1
400 CAPSULE ORAL
Qty: 96 CAPSULE | Refills: 0 | Status: SHIPPED | OUTPATIENT
Start: 2024-01-04 | End: 2024-01-14

## 2024-01-04 RX ORDER — LORAZEPAM 0.5 MG/1
0.5 TABLET ORAL PRN
Status: COMPLETED | OUTPATIENT
Start: 2024-01-04 | End: 2024-01-04

## 2024-01-04 RX ORDER — PREDNISONE 20 MG/1
60 TABLET ORAL DAILY
Qty: 15 TABLET | Refills: 0 | Status: SHIPPED | OUTPATIENT
Start: 2024-01-04 | End: 2024-01-09

## 2024-01-04 RX ORDER — PREDNISONE 10 MG/1
40 TABLET ORAL DAILY
Qty: 4 TABLET | Refills: 0 | Status: SHIPPED | OUTPATIENT
Start: 2024-01-11 | End: 2024-01-12

## 2024-01-04 RX ORDER — ACYCLOVIR 200 MG/1
400 CAPSULE ORAL
Status: DISCONTINUED | OUTPATIENT
Start: 2024-01-04 | End: 2024-01-04 | Stop reason: HOSPADM

## 2024-01-04 RX ADMIN — ACYCLOVIR 400 MG: 200 CAPSULE ORAL at 15:21

## 2024-01-04 RX ADMIN — ENOXAPARIN SODIUM 30 MG: 100 INJECTION SUBCUTANEOUS at 09:34

## 2024-01-04 RX ADMIN — PREDNISONE 60 MG: 20 TABLET ORAL at 09:34

## 2024-01-04 RX ADMIN — ACETAMINOPHEN 650 MG: 325 TABLET ORAL at 10:41

## 2024-01-04 RX ADMIN — ACYCLOVIR 400 MG: 200 CAPSULE ORAL at 12:55

## 2024-01-04 RX ADMIN — PERFLUTREN 1.5 ML: 6.52 INJECTION, SUSPENSION INTRAVENOUS at 12:05

## 2024-01-04 RX ADMIN — ASPIRIN 81 MG: 81 TABLET, CHEWABLE ORAL at 09:34

## 2024-01-04 RX ADMIN — LORAZEPAM 0.5 MG: 0.5 TABLET ORAL at 07:50

## 2024-01-04 RX ADMIN — Medication 10 ML: at 09:35

## 2024-01-04 ASSESSMENT — PAIN - FUNCTIONAL ASSESSMENT: PAIN_FUNCTIONAL_ASSESSMENT: ACTIVITIES ARE NOT PREVENTED

## 2024-01-04 ASSESSMENT — PAIN DESCRIPTION - LOCATION: LOCATION: HEAD

## 2024-01-04 ASSESSMENT — PAIN DESCRIPTION - DESCRIPTORS: DESCRIPTORS: ACHING

## 2024-01-04 ASSESSMENT — PAIN SCALES - GENERAL
PAINLEVEL_OUTOF10: 3
PAINLEVEL_OUTOF10: 0

## 2024-01-04 NOTE — PROGRESS NOTES
4 Eyes Skin Assessment     NAME:  Eran Griffiths  YOB: 1970  MEDICAL RECORD NUMBER:  [id number]    The patient is being assessed for  Admission    I agree that at least one RN has performed a thorough Head to Toe Skin Assessment on the patient. ALL assessment sites listed below have been assessed.      Areas assessed by both nurses:    Head, Face, Ears, Shoulders, Back, Chest, Arms, Elbows, Hands, Sacrum. Buttock, Coccyx, Ischium, Legs. Feet and Heels, and Under Medical Devices         Does the Patient have a Wound? No noted wound(s)       Miguelito Prevention initiated by RN: No  Wound Care Orders initiated by RN: No    Pressure Injury (Stage 3,4, Unstageable, DTI, NWPT, and Complex wounds) if present, place Wound referral order by RN under : No    New Ostomies, if present place, Ostomy referral order under : No     Nurse 1 eSignature: Electronically signed by Sania Bowie RN on 1/3/24 at 10:21 PM EST    **SHARE this note so that the co-signing nurse can place an eSignature**    Nurse 2 eSignature: Electronically signed by Jia Cochran RN on 1/3/24 at 10:27 PM EST   
Discharge orders acknowledged by RN . Discharge teaching completed with pt and family. AVS reviewed and all questions answered. Medication regimen reviewed and pt understands schedule. E-scripts sent to pt RX. Patient provided with eye patch to protect left eye and verbalized understanding. Follow up appointments also reviewed with pt and resources given for discharge. IV removed. Bedside monitor removed from pt. Pt vitals WNL. Pt discharged with all belongings to home with self. Pt transported off of unit via wheelchair. No complications.     
Handoff complete with SARAH Pierce. Pt left in stable condition.   
Medication Reconciliation    List of medications patient is currently taking is complete.     Source of information: 1. Conversation with patient at bedside                                      2. EPIC records      Allergies  Patient has no known allergies.     Notes regarding home medications:   1. Patient reports he didn't take any medications this morning PTA  2. Reports he takes the HCTZ as needed for swelling now by his own decision.      Carlos Rivera [facility]   1/3/2024  3:38 PM    
Occupational Therapy Attempt  Eran Griffiths  1970  MRN: [id number]    OT orders received and chart reviewed. OT/PT to pt's room to see for eval/tx this date. Pt reporting he has been up ambulating independently and has no difficulty completing ADLs at this time. No therapy needs at this time and will sign off.    Electronically signed by TRINO Barkley/L on 1/4/2024 at 10:58 AM   
Physical Therapy    Eran Griffiths  [id number]  [id number]    PT orders received and chart reviewed; attempted to see for PT katie however pt reports he is Ind and does not have any therapy needs; will sign off from therapy at this time  Electronically signed by ANIA MOSHER, PT on 1/4/2024 at 10:55 AM       
Pt arrived to floor via stretcher from ED and ambulated to bed. Portable telemetry activated. Patient oriented to room and use of call light. Call light and personal items within reach. Admission and assessment initiated. POC and education initiated and reviewed with patient and family. Denied further needs or questions at this time.    
Pt resting in bed quietly. Pt is alert and oriented, VSS On RA. PRN Tylenol was given for headache on left side of head that pt said has been ongoing for a couple days. Pt still with numbness in left face but states that numbness in his left arm is not there currently.   
Report called to 5W RN. Pt to go to room 6050.  All questions answered at this time  
SLP NOTE    Evaluation attempted. Ativan administered to patient prior to evaluation attempt in anticipation of upcoming MRI. ST to re-attempt assessment as schedule permits unless otherwise notified after Ativan effects have dissipated.    Thank you.  Isadora Restrepo MA, CCC-SLP, #[id number]  Speech-Language Pathologist  Portable phone: [phone]    
   AST 18 15 - 37 U/L   Troponin    Collection Time: 01/03/24  1:13 PM   Result Value Ref Range    Troponin, High Sensitivity 10 0 - 22 ng/L   Protime-INR    Collection Time: 01/03/24  1:13 PM   Result Value Ref Range    Protime 13.3 11.5 - 14.8 sec    INR 1.01 0.84 - 1.16   EKG 12 Lead    Collection Time: 01/03/24  2:24 PM   Result Value Ref Range    Ventricular Rate 70 BPM    Atrial Rate 70 BPM    P-R Interval 140 ms    QRS Duration 98 ms    Q-T Interval 414 ms    QTc Calculation (Bazett) 447 ms    P Axis 26 degrees    R Axis -11 degrees    T Axis 28 degrees    Diagnosis       Normal sinus rhythmNormal ECGWhen compared with ECG of 13-MAR-2023 15:50,No significant change was foundConfirmed by RHEA MORENO MD ([id number]) on 1/4/2024 9:19:19 AM   Troponin    Collection Time: 01/03/24  3:24 PM   Result Value Ref Range    Troponin, High Sensitivity 9 0 - 22 ng/L   Lipid Panel    Collection Time: 01/03/24  3:24 PM   Result Value Ref Range    Cholesterol, Total 102 0 - 199 mg/dL    Triglycerides 76 0 - 150 mg/dL    HDL 35 (L) 40 - 60 mg/dL    LDL Calculated 52 <100 mg/dL    VLDL Cholesterol Calculated 15 Not Established mg/dL   CBC with Auto Differential    Collection Time: 01/04/24  5:12 AM   Result Value Ref Range    WBC 12.3 (H) 4.0 - 11.0 K/uL    RBC 5.37 4.20 - 5.90 M/uL    Hemoglobin 16.6 13.5 - 17.5 g/dL    Hematocrit 46.8 40.5 - 52.5 %    MCV 87.0 80.0 - 100.0 fL    MCH 30.9 26.0 - 34.0 pg    MCHC 35.5 31.0 - 36.0 g/dL    RDW 13.0 12.4 - 15.4 %    Platelets 228 135 - 450 K/uL    MPV 7.6 5.0 - 10.5 fL    Neutrophils % 72.3 %    Lymphocytes % 19.7 %    Monocytes % 7.4 %    Eosinophils % 0.2 %    Basophils % 0.4 %    Neutrophils Absolute 8.8 (H) 1.7 - 7.7 K/uL    Lymphocytes Absolute 2.4 1.0 - 5.1 K/uL    Monocytes Absolute 0.9 0.0 - 1.3 K/uL    Eosinophils Absolute 0.0 0.0 - 0.6 K/uL    Basophils Absolute 0.1 0.0 - 0.2 K/uL   Basic Metabolic Panel    Collection Time: 01/04/24  5:12 AM   Result Value Ref Range    
   [x]Labial ROM/Coordination    []Labial Symmetry   []Lingual ROM/Coordination   []Lingual Symmetry  []Gag  []Other:     Oral Phase: []WFL [x]Mild   [] Moderate  []Severe  []To be assessed   []Impaired/Prolonged Mastication:   []Spillage Left:   []Spillage Right:  []Pocketing Left:   []Pocketing Right:   []Decreased Anterior to Posterior Transit:   []Suspected Premature Bolus Loss:   []Lingual/Palatal Residue:   [x]Other: reduced labial seal    Pharyngeal Phase: [x]WFL []Mild   [] Moderate  []Severe  []To be assessed   []Delayed Swallow:   []Suspected Pharyngeal Pooling:   []Decreased Laryngeal Elevation:   []Absent Swallow:  []Wet Vocal Quality:   []Throat Clearing-Immediate:   []Throat Clearing-Delayed:   []Cough-Immediate:   []Cough-Delayed:  []Change in Vital Signs:  []Suspected Delayed Pharyngeal Clearing:  []Other:       Eating Assistance:  [x]Independent  []Setup or clean-up assistance   [] Supervision or touching assistance   [] Partial or moderate assistance   [] Substantial or maximal assistance  [] Dependent       EDUCATION:   Provided education regarding role of SLP, results of assessment, recommendations and general speech pathology plan of care.   [x] Pt verbalized understanding and agreement   [] Pt requires ongoing learning   [] No evidence of comprehension     If patient discharges prior to next visit, this note will serve as discharge.     Timed Code Minutes: 0  Total Treatment Minutes: 20    Electronically signed by:    Isadora Restrepo MA, CCC-SLP, #[id number]  Speech-Language Pathologist  Portable phone: [phone]  01/04/24 2:00 PM

## 2024-01-04 NOTE — CONSULTS
safety.    LABS (All labs are from the date of today's encounter unless otherwise noted.)  Na:  137  K:  3.9  BUN:   11  Creatinine:  0.5  Glucose:  118  Calcium:  8.8    Total cholesterol:  102  HDL:  35  LDL:  52  Triglycerides:  76    TSH:  0.55    WBC:  12.3  RBC:  5.37  Hgb:  16.6  Hct:  46.8  Platelets:  228    PT:  13.3 (1/3/24)  INR:  1.01 (1/3/24)    UA: Negative nitrite, negative leukocyte esterase (1/4/24)    STUDIES:  Echocardiogram pending.    MRI Brain w/o contrast 1/4/23.  Independently reviewed images.  IMPRESSION:  Normal MRI of the brain.    CT Head w/o contrast 1/3/23.  Independently reviewed images.  IMPRESSION:  No evidence of acute intracranial abnormality.    CTA Head & Neck w/contrast 1/3/24.  Independently reviewed images.  IMPRESSION:  1. No flow limiting stenosis or dissection of the cervical carotid/vertebral  arteries.  2. No significant stenosis or large vessel occlusion of the [id number].    IMPRESSION:  1. Left upper and lower facial weakness c/w Bell's Palsy  2. Recent COVID infection  3. Mild leukocytosis  4. HTN  5. HLD  6. CAD  7. SVT and atrial flutter s/p AVNRT ablation    Eran Griffiths is a 53 y.o. male who presented after awaking yesterday morning with L upper and lower facial weakness.  MRI ruled out ischemia.  Remaining neurological exam intact.  Symptoms are consistent with Bell's Palsy in the setting of a recent COVID infection.    RECOMMENDATIONS:  -No indication for ASA from neurological standpoint, patient follows with EP and this was not a home medication.  -Prednisone 60 mg for 5 days, after that taper down 10 mg every day until discontinued.  -Acyclovir 400 mg PO 5 times a day for a total of 10 days. (ordered)  -Labs:  TSH, UA (ordered and completed)  -Discussed need for L eye protection including lubrication and avoiding rubbing the eye.  Also discussed taping eye for complete closure while sleeping.  -Will discuss with neurology attending physician,

## 2024-01-04 NOTE — PLAN OF CARE
Problem: Discharge Planning  Goal: Discharge to home or other facility with appropriate resources  Outcome: Progressing     Problem: Pain  Goal: Verbalizes/displays adequate comfort level or baseline comfort level  Outcome: Progressing     Problem: Neurosensory - Adult  Goal: Achieves stable or improved neurological status  Outcome: Progressing  Goal: Absence of seizures  Outcome: Progressing  Goal: Remains free of injury related to seizures activity  Outcome: Progressing  Goal: Achieves maximal functionality and self care  Outcome: Progressing

## 2024-01-04 NOTE — CARE COORDINATION
DISCHARGE PLANNING:    Per chart review and rounds, patient is from home independent.  Patient has no SW/CM needs at tisi time.  If needs arise, please consult SW/CM.      #[phone]  Electronically signed by Cindy Rosa RN on 1/4/2024 at 10:54 AM

## 2024-01-05 NOTE — DISCHARGE SUMMARY
none    Discharge Medications:        Medication List        START taking these medications      acyclovir 200 MG capsule  Commonly known as: ZOVIRAX  Take 2 capsules by mouth 5 times daily for 48 doses     * predniSONE 20 MG tablet  Commonly known as: DELTASONE  Take 3 tablets by mouth daily for 5 days     * predniSONE 10 MG tablet  Commonly known as: DELTASONE  Take 5 tablets by mouth daily for 1 day  Start taking on: January 10, 2024     * predniSONE 10 MG tablet  Commonly known as: DELTASONE  Take 4 tablets by mouth daily for 1 day  Start taking on: January 11, 2024     * predniSONE 5 MG tablet  Commonly known as: DELTASONE  Take 6 tablets by mouth daily for 1 day  Start taking on: January 12, 2024     * predniSONE 20 MG tablet  Commonly known as: DELTASONE  Take 1 tablet by mouth daily for 1 day  Start taking on: January 14, 2024     * predniSONE 20 MG tablet  Commonly known as: DELTASONE  Take 0.5 tablets by mouth daily for 1 dose  Start taking on: January 15, 2024           * This list has 6 medication(s) that are the same as other medications prescribed for you. Read the directions carefully, and ask your doctor or other care provider to review them with you.                CONTINUE taking these medications      atorvastatin 40 MG tablet  Commonly known as: LIPITOR  TAKE ONE TABLET BY MOUTH DAILY     hydroCHLOROthiazide 25 MG tablet  Commonly known as: HYDRODIURIL  Take 1 tablet by mouth daily     vitamin D 25 MCG (1000 UT) Tabs tablet  Commonly known as: CHOLECALCIFEROL               Where to Get Your Medications        These medications were sent to [facility] [id number] - [facility] [address] [facility] [address] - P [phone] - F [phone]  [address] [address]      Phone: [phone]   acyclovir 200 MG capsule  predniSONE 10 MG tablet  predniSONE 10 MG tablet  predniSONE 20 MG tablet  predniSONE 20 MG tablet  predniSONE 20 MG tablet  predniSONE 5 MG tablet        Objective

## 2024-03-19 NOTE — PROGRESS NOTES
Cardiac Electrophysiology   Date: 3/29/2024   Reason for Consultation: SVT  Consult Requesting Physician: Gio Sainz MD  Primary Care Physician: Calixto Baires MD    Chief Complaint:   Chief Complaint   Patient presents with    Follow-up     F/U 6 months- No CC     HPI: Eran Griffiths is a 53 y.o. patient with a history of HLD, HTN, sleep apnea.  He presented to [facility] ED on 5/21/2022 via EMS after he began experiencing symptoms of palpitations and heart racing at home. EMS had him perform vagal maneuvers and he converted to NSR. We have requested tracing for EKG completed by EMS as well as note from the transport.    He was first seen in office on 05/27/2022 referred by his primary cardiologist Dr. Sainz for evaluation of SVT. He recalled the story that brought him the the ED. He states he was coloring his wife's hair and when he got done, his heart starting pounding. He states that was the first episode he experienced. He took an ASA and called 911 and was found to have a HR of 200. His wife got their neighbor who has SVT hx and ablation who had the patient perform vagal maneuvers. It terminated the fast rhythm while enroute to the ED.  Treatment options dicussed. Patient chose conservative treatment with use of BB and vagal maneuvers to terminate the episodes of SVT.    He was seen in office on  01/25/2023 by BENITO Sainz MD and reported reoccurrence of SVT on 01/24/2022 with symptoms of palpitations and heart racing. Reported symptoms resolved with valsalva maneuver but reports feeling fatigued and weak the rest of the day.    He presented to [facility] ED on 02/04/2023 reporting symptoms of palpitations. Diagnosed with atrial fibrillation /atrial flutter but review of the ECGs shows atrial flutter, not atrial fibrillation. He was given Metoprolol bolus x 3 and converted to NSR Started on Eliquis 5 mg BID.    03/03/23 Recently diagnosed with atrial flutter and felt different than when he had

## 2024-03-29 ENCOUNTER — OFFICE VISIT (OUTPATIENT)
Dept: CARDIOLOGY CLINIC | Age: 54
End: 2024-03-29
Payer: COMMERCIAL

## 2024-03-29 VITALS
BODY MASS INDEX: 43.38 KG/M2 | SYSTOLIC BLOOD PRESSURE: 130 MMHG | OXYGEN SATURATION: 93 % | DIASTOLIC BLOOD PRESSURE: 78 MMHG | HEIGHT: 70 IN | WEIGHT: 303 LBS | HEART RATE: 67 BPM

## 2024-03-29 DIAGNOSIS — G47.33 OSA (OBSTRUCTIVE SLEEP APNEA): ICD-10-CM

## 2024-03-29 DIAGNOSIS — I25.10 CORONARY ARTERY DISEASE INVOLVING NATIVE CORONARY ARTERY OF NATIVE HEART WITHOUT ANGINA PECTORIS: ICD-10-CM

## 2024-03-29 DIAGNOSIS — I10 ESSENTIAL HYPERTENSION: ICD-10-CM

## 2024-03-29 DIAGNOSIS — I48.3 TYPICAL ATRIAL FLUTTER (HCC): ICD-10-CM

## 2024-03-29 DIAGNOSIS — I47.10 PSVT (PAROXYSMAL SUPRAVENTRICULAR TACHYCARDIA) (HCC): Primary | ICD-10-CM

## 2024-03-29 PROCEDURE — 93000 ELECTROCARDIOGRAM COMPLETE: CPT | Performed by: INTERNAL MEDICINE

## 2024-03-29 PROCEDURE — 3017F COLORECTAL CA SCREEN DOC REV: CPT | Performed by: INTERNAL MEDICINE

## 2024-03-29 PROCEDURE — G8484 FLU IMMUNIZE NO ADMIN: HCPCS | Performed by: INTERNAL MEDICINE

## 2024-03-29 PROCEDURE — G8427 DOCREV CUR MEDS BY ELIG CLIN: HCPCS | Performed by: INTERNAL MEDICINE

## 2024-03-29 PROCEDURE — 1036F TOBACCO NON-USER: CPT | Performed by: INTERNAL MEDICINE

## 2024-03-29 PROCEDURE — 3075F SYST BP GE 130 - 139MM HG: CPT | Performed by: INTERNAL MEDICINE

## 2024-03-29 PROCEDURE — G8417 CALC BMI ABV UP PARAM F/U: HCPCS | Performed by: INTERNAL MEDICINE

## 2024-03-29 PROCEDURE — 3078F DIAST BP <80 MM HG: CPT | Performed by: INTERNAL MEDICINE

## 2024-03-29 PROCEDURE — 99215 OFFICE O/P EST HI 40 MIN: CPT | Performed by: INTERNAL MEDICINE

## 2024-03-29 RX ORDER — HYDROCHLOROTHIAZIDE 25 MG/1
25 TABLET ORAL DAILY
Qty: 90 TABLET | Refills: 3 | Status: SHIPPED | OUTPATIENT
Start: 2024-03-29

## 2024-05-21 ENCOUNTER — OFFICE VISIT (OUTPATIENT)
Dept: PRIMARY CARE CLINIC | Age: 54
End: 2024-05-21
Payer: COMMERCIAL

## 2024-05-21 VITALS
TEMPERATURE: 97.6 F | WEIGHT: 300 LBS | RESPIRATION RATE: 13 BRPM | BODY MASS INDEX: 42.95 KG/M2 | DIASTOLIC BLOOD PRESSURE: 84 MMHG | HEIGHT: 70 IN | SYSTOLIC BLOOD PRESSURE: 122 MMHG | HEART RATE: 73 BPM | OXYGEN SATURATION: 98 %

## 2024-05-21 DIAGNOSIS — G47.33 OSA (OBSTRUCTIVE SLEEP APNEA): ICD-10-CM

## 2024-05-21 DIAGNOSIS — K42.9 UMBILICAL HERNIA WITHOUT OBSTRUCTION AND WITHOUT GANGRENE: ICD-10-CM

## 2024-05-21 DIAGNOSIS — Z82.49 FAMILY HISTORY OF EARLY CAD: ICD-10-CM

## 2024-05-21 DIAGNOSIS — I10 ESSENTIAL HYPERTENSION: Primary | ICD-10-CM

## 2024-05-21 DIAGNOSIS — E78.2 MIXED HYPERLIPIDEMIA: ICD-10-CM

## 2024-05-21 DIAGNOSIS — R73.9 HYPERGLYCEMIA: ICD-10-CM

## 2024-05-21 PROCEDURE — G8417 CALC BMI ABV UP PARAM F/U: HCPCS | Performed by: FAMILY MEDICINE

## 2024-05-21 PROCEDURE — 1036F TOBACCO NON-USER: CPT | Performed by: FAMILY MEDICINE

## 2024-05-21 PROCEDURE — G8427 DOCREV CUR MEDS BY ELIG CLIN: HCPCS | Performed by: FAMILY MEDICINE

## 2024-05-21 PROCEDURE — 3079F DIAST BP 80-89 MM HG: CPT | Performed by: FAMILY MEDICINE

## 2024-05-21 PROCEDURE — 3074F SYST BP LT 130 MM HG: CPT | Performed by: FAMILY MEDICINE

## 2024-05-21 PROCEDURE — 99204 OFFICE O/P NEW MOD 45 MIN: CPT | Performed by: FAMILY MEDICINE

## 2024-05-21 PROCEDURE — 3017F COLORECTAL CA SCREEN DOC REV: CPT | Performed by: FAMILY MEDICINE

## 2024-05-21 SDOH — ECONOMIC STABILITY: HOUSING INSECURITY
IN THE LAST 12 MONTHS, WAS THERE A TIME WHEN YOU DID NOT HAVE A STEADY PLACE TO SLEEP OR SLEPT IN A SHELTER (INCLUDING NOW)?: NO

## 2024-05-21 SDOH — ECONOMIC STABILITY: FOOD INSECURITY: WITHIN THE PAST 12 MONTHS, THE FOOD YOU BOUGHT JUST DIDN'T LAST AND YOU DIDN'T HAVE MONEY TO GET MORE.: NEVER TRUE

## 2024-05-21 SDOH — ECONOMIC STABILITY: INCOME INSECURITY: HOW HARD IS IT FOR YOU TO PAY FOR THE VERY BASICS LIKE FOOD, HOUSING, MEDICAL CARE, AND HEATING?: NOT HARD AT ALL

## 2024-05-21 SDOH — ECONOMIC STABILITY: FOOD INSECURITY: WITHIN THE PAST 12 MONTHS, YOU WORRIED THAT YOUR FOOD WOULD RUN OUT BEFORE YOU GOT MONEY TO BUY MORE.: NEVER TRUE

## 2024-05-21 ASSESSMENT — PATIENT HEALTH QUESTIONNAIRE - PHQ9
2. FEELING DOWN, DEPRESSED OR HOPELESS: NOT AT ALL
SUM OF ALL RESPONSES TO PHQ QUESTIONS 1-9: 0
1. LITTLE INTEREST OR PLEASURE IN DOING THINGS: NOT AT ALL
SUM OF ALL RESPONSES TO PHQ9 QUESTIONS 1 & 2: 0

## 2024-05-21 ASSESSMENT — ENCOUNTER SYMPTOMS
RESPIRATORY NEGATIVE: 1
GASTROINTESTINAL NEGATIVE: 1
EYES NEGATIVE: 1

## 2024-09-20 ENCOUNTER — TELEPHONE (OUTPATIENT)
Dept: CARDIOLOGY CLINIC | Age: 54
End: 2024-09-20

## 2024-10-02 NOTE — PROGRESS NOTES
[facility]      Cardiology Consult    Eran Griffiths  1970    October 2, 2024    Referring Physician: Elvis Alonso MD  Reason for Referral: CAD    CC: \"Yesterday my heart was racing and I was short of breath.\"     HPI:  The patient is 53 y.o. male with a past medical history significant for sleep apnea, obesity, and hypertension who presents today for management of CAD and SVT. He was initially seen for evaluation of an abnormal stress test. He reported an episode of lightheadedness with associated diaphoresis that occurred with walking in October. He denied any recurrence but stated he was concerned and felt he should be seen by his primary care physician. He completed a stress test 10/14/21 that suggested reversible ischemia. He denied any chest pains when completing his stress test but the ECG portion of the stress test was normal and had no inducible chest pain. Coronary CTA showed minimal nonobstructive CAD. Stress test defects likely artifactual. He sought treatment in the ED 5/21/22 for palpitations that he described as his heart \"racing.\" He felt quite unwell during the event with associated chest discomfort and dyspnea. EMS reported the patient was in SVT with a rate of 200 and converted to sinus with valsalva maneuver but the actual EKG is not available in office. He was referred to Dr. Huntley to discuss possible ablation, but preferred conservative management.     He sought treatment in the ED 2/4/23 for palpitations, and found to be in typical atrial flutter. He ultimately underwent a typical atrial flutter and SVT ablation with Dr. Huntley on 3/13/23.    Admission 1/2024, stroke like symptoms, workup negative, likely bells palsy          Past Medical History:   Diagnosis Date    Hyperlipidemia     Hypertension     Sleep apnea      Past Surgical History:   Procedure Laterality Date    ROTATOR CUFF REPAIR Right      Family History   Problem Relation Age of Onset    Other Other

## 2024-10-07 ENCOUNTER — OFFICE VISIT (OUTPATIENT)
Dept: CARDIOLOGY CLINIC | Age: 54
End: 2024-10-07
Payer: COMMERCIAL

## 2024-10-07 VITALS
DIASTOLIC BLOOD PRESSURE: 72 MMHG | HEIGHT: 70 IN | WEIGHT: 296 LBS | BODY MASS INDEX: 42.37 KG/M2 | HEART RATE: 64 BPM | SYSTOLIC BLOOD PRESSURE: 124 MMHG | OXYGEN SATURATION: 98 %

## 2024-10-07 DIAGNOSIS — E78.2 MIXED HYPERLIPIDEMIA: ICD-10-CM

## 2024-10-07 DIAGNOSIS — I10 ESSENTIAL HYPERTENSION: ICD-10-CM

## 2024-10-07 DIAGNOSIS — I47.10 PSVT (PAROXYSMAL SUPRAVENTRICULAR TACHYCARDIA) (HCC): ICD-10-CM

## 2024-10-07 DIAGNOSIS — I25.10 CORONARY ARTERY DISEASE INVOLVING NATIVE CORONARY ARTERY OF NATIVE HEART WITHOUT ANGINA PECTORIS: Primary | ICD-10-CM

## 2024-10-07 PROCEDURE — G8484 FLU IMMUNIZE NO ADMIN: HCPCS | Performed by: INTERNAL MEDICINE

## 2024-10-07 PROCEDURE — 3074F SYST BP LT 130 MM HG: CPT | Performed by: INTERNAL MEDICINE

## 2024-10-07 PROCEDURE — 3017F COLORECTAL CA SCREEN DOC REV: CPT | Performed by: INTERNAL MEDICINE

## 2024-10-07 PROCEDURE — G8417 CALC BMI ABV UP PARAM F/U: HCPCS | Performed by: INTERNAL MEDICINE

## 2024-10-07 PROCEDURE — 3078F DIAST BP <80 MM HG: CPT | Performed by: INTERNAL MEDICINE

## 2024-10-07 PROCEDURE — 99214 OFFICE O/P EST MOD 30 MIN: CPT | Performed by: INTERNAL MEDICINE

## 2024-10-07 PROCEDURE — 93000 ELECTROCARDIOGRAM COMPLETE: CPT | Performed by: INTERNAL MEDICINE

## 2024-10-07 PROCEDURE — 1036F TOBACCO NON-USER: CPT | Performed by: INTERNAL MEDICINE

## 2024-10-07 PROCEDURE — G8427 DOCREV CUR MEDS BY ELIG CLIN: HCPCS | Performed by: INTERNAL MEDICINE

## 2024-10-07 NOTE — PROGRESS NOTES
[facility]      Cardiology Consult    Eran Griffiths  1970    October 7, 2024    Referring Physician: Elvis Alonso MD  Reason for Referral: CAD    CC: \"Blood pressure was up a few days\"    HPI:  The patient is 53 y.o. male with a past medical history significant for sleep apnea, obesity, and hypertension who presents today for management of CAD and SVT. He was initially seen for evaluation of an abnormal stress test. He reported an episode of lightheadedness with associated diaphoresis that occurred with walking. He denied any recurrence but stated he was concerned and felt he should be seen by his primary care physician. He completed a stress test 10/14/21 that suggested reversible ischemia. He denied any chest pains when completing his stress test but the ECG portion of the stress test was normal and had no inducible chest pain. Coronary CTA showed minimal nonobstructive CAD. Stress test defects likely artifactual. He sought treatment in the ED 5/21/22 for palpitations that he described as his heart \"racing.\" He felt quite unwell during the event with associated chest discomfort and dyspnea. EMS reported the patient was in SVT with a rate of 200 and converted to sinus with valsalva maneuver but the actual EKG was not available in office. He was referred to Dr. Huntley to discuss possible ablation, but preferred conservative management. He sought treatment in the ED 2/4/23 for palpitations, and found to be in typical atrial flutter. He ultimately underwent a typical atrial flutter and SVT ablation with Dr. Huntley on 3/13/23.      Today, he complains of a vague \"dizzy\" sensation.  He denies feeling lightheaded and denies vertigo.  He has a difficult time describing the sensation but states he feels that \"a little\" presently.  His blood pressure is normal.  His ECG is normal.  He continues to have intermittent episodes of rapid palpitations.  He reports breaking some episodes with vagal

## 2024-10-10 ENCOUNTER — OFFICE VISIT (OUTPATIENT)
Dept: PRIMARY CARE CLINIC | Age: 54
End: 2024-10-10

## 2024-10-10 VITALS
WEIGHT: 295 LBS | HEART RATE: 74 BPM | BODY MASS INDEX: 42.23 KG/M2 | OXYGEN SATURATION: 96 % | DIASTOLIC BLOOD PRESSURE: 70 MMHG | SYSTOLIC BLOOD PRESSURE: 130 MMHG | HEIGHT: 70 IN

## 2024-10-10 DIAGNOSIS — R73.9 HYPERGLYCEMIA: ICD-10-CM

## 2024-10-10 DIAGNOSIS — I47.10 SVT (SUPRAVENTRICULAR TACHYCARDIA) (HCC): ICD-10-CM

## 2024-10-10 DIAGNOSIS — G47.33 OSA (OBSTRUCTIVE SLEEP APNEA): ICD-10-CM

## 2024-10-10 DIAGNOSIS — E78.2 MIXED HYPERLIPIDEMIA: ICD-10-CM

## 2024-10-10 DIAGNOSIS — M79.10 MYALGIA: ICD-10-CM

## 2024-10-10 DIAGNOSIS — R42 VERTIGO: ICD-10-CM

## 2024-10-10 DIAGNOSIS — I10 ESSENTIAL HYPERTENSION: ICD-10-CM

## 2024-10-10 DIAGNOSIS — Z82.49 FAMILY HISTORY OF EARLY CAD: ICD-10-CM

## 2024-10-10 DIAGNOSIS — I10 ESSENTIAL HYPERTENSION: Primary | ICD-10-CM

## 2024-10-10 LAB
ALBUMIN SERPL-MCNC: 4.2 G/DL (ref 3.4–5)
ALP SERPL-CCNC: 79 U/L (ref 40–129)
ALT SERPL-CCNC: 32 U/L (ref 10–40)
ANION GAP SERPL CALCULATED.3IONS-SCNC: 10 MMOL/L (ref 3–16)
AST SERPL-CCNC: 22 U/L (ref 15–37)
BASOPHILS # BLD: 0.1 K/UL (ref 0–0.2)
BASOPHILS NFR BLD: 1.2 %
BILIRUB DIRECT SERPL-MCNC: 0.5 MG/DL (ref 0–0.3)
BILIRUB INDIRECT SERPL-MCNC: 0.7 MG/DL (ref 0–1)
BILIRUB SERPL-MCNC: 1.2 MG/DL (ref 0–1)
BUN SERPL-MCNC: 12 MG/DL (ref 7–20)
CALCIUM SERPL-MCNC: 9.3 MG/DL (ref 8.3–10.6)
CHLORIDE SERPL-SCNC: 104 MMOL/L (ref 99–110)
CHOLEST SERPL-MCNC: 86 MG/DL (ref 0–199)
CO2 SERPL-SCNC: 26 MMOL/L (ref 21–32)
CREAT SERPL-MCNC: 0.7 MG/DL (ref 0.9–1.3)
DEPRECATED RDW RBC AUTO: 13.2 % (ref 12.4–15.4)
EOSINOPHIL # BLD: 0.2 K/UL (ref 0–0.6)
EOSINOPHIL NFR BLD: 2.8 %
EST. AVERAGE GLUCOSE BLD GHB EST-MCNC: 102.5 MG/DL
GFR SERPLBLD CREATININE-BSD FMLA CKD-EPI: >90 ML/MIN/{1.73_M2}
GLUCOSE SERPL-MCNC: 112 MG/DL (ref 70–99)
HBA1C MFR BLD: 5.2 %
HCT VFR BLD AUTO: 47.1 % (ref 40.5–52.5)
HCV AB SERPL QL IA: NORMAL
HDLC SERPL-MCNC: 35 MG/DL (ref 40–60)
HGB BLD-MCNC: 16.4 G/DL (ref 13.5–17.5)
LDLC SERPL CALC-MCNC: 35 MG/DL
LYMPHOCYTES # BLD: 1.9 K/UL (ref 1–5.1)
LYMPHOCYTES NFR BLD: 31.5 %
MCH RBC QN AUTO: 31.5 PG (ref 26–34)
MCHC RBC AUTO-ENTMCNC: 34.8 G/DL (ref 31–36)
MCV RBC AUTO: 90.6 FL (ref 80–100)
MONOCYTES # BLD: 0.5 K/UL (ref 0–1.3)
MONOCYTES NFR BLD: 8.4 %
NEUTROPHILS # BLD: 3.4 K/UL (ref 1.7–7.7)
NEUTROPHILS NFR BLD: 56.1 %
PLATELET # BLD AUTO: 223 K/UL (ref 135–450)
PMV BLD AUTO: 8.5 FL (ref 5–10.5)
POTASSIUM SERPL-SCNC: 3.9 MMOL/L (ref 3.5–5.1)
PROT SERPL-MCNC: 6.3 G/DL (ref 6.4–8.2)
RBC # BLD AUTO: 5.19 M/UL (ref 4.2–5.9)
SODIUM SERPL-SCNC: 140 MMOL/L (ref 136–145)
TRIGL SERPL-MCNC: 80 MG/DL (ref 0–150)
TSH SERPL DL<=0.005 MIU/L-ACNC: 1.26 UIU/ML (ref 0.27–4.2)
VLDLC SERPL CALC-MCNC: 16 MG/DL
WBC # BLD AUTO: 6.1 K/UL (ref 4–11)

## 2024-10-10 ASSESSMENT — ENCOUNTER SYMPTOMS
RESPIRATORY NEGATIVE: 1
GASTROINTESTINAL NEGATIVE: 1
EYES NEGATIVE: 1

## 2024-10-10 NOTE — PROGRESS NOTES
SUBJECTIVE:  Patient ID: Eran Griffiths is a 53 y.o. y.o. male     HPI   Vertigo - Dizziness: Patient presents with dizziness .  The dizziness has been present for several days. The patient describes the symptoms as vertigo. Symptoms are exacerbated by rapid head movements rolling over in bed rising from squatting or sitting position bending The patient also complains of aural pressure bilaterally which is ongoing. Patient denies aural pressure bilaterally which is ongoing.  She has been treated with nothing  Previous work up has been .  Fatigue: Patient complains of fatigue. Symptoms began several months ago. Sentinal symptom the patient feels fatigue began with: witnessed or suspected sleep apnea. Symptoms of her fatigue have been general malaise. Patient describes the following psychologic symptoms: none.  Patient denies fever, significant change in weight, exercise intolerance, unusual rashes, and cold intolerance, constipation and change in hair texture.. Symptoms have gradually worsened. Severity has been mild. Previous visits for this problem: none.   Patient with sleep apnea has been using has not been using his CPAP  Patient coronary artery disease followed by cardiology he had some arrhythmia he had episodes irregular heartbeat he feels very tired he is seen last week by his cardiologist  Patient with hypertension stable on current medications takes hydrochlorothiazide once a day  Hyperlipidemia on atorvastatin stable will be back until fasting blood work  Patient his father passed away from cardiac disease at early age in the 40s since then he quit smoking  Patient has a small umbilical hernia does not bother him occasionally he knows he needs to lose weight to help    Patient with a prior SVT had ablation symptoms is back  Past Medical History:   Diagnosis Date    Atrial flutter (HCC)     Hyperlipidemia     Hypertension     PSVT (paroxysmal supraventricular tachycardia) ([facility])     Sleep apnea       Past

## 2024-10-11 LAB
SHBG SERPL-SCNC: 35 NMOL/L (ref 19–76)
TESTOST FREE SERPL-MCNC: 47.5 PG/ML (ref 47–244)
TESTOST SERPL-MCNC: 254 NG/DL (ref 193–740)

## 2024-10-25 ENCOUNTER — TELEPHONE (OUTPATIENT)
Dept: PRIMARY CARE CLINIC | Age: 54
End: 2024-10-25

## 2024-11-01 ENCOUNTER — TELEPHONE (OUTPATIENT)
Dept: PRIMARY CARE CLINIC | Age: 54
End: 2024-11-01

## 2024-11-01 RX ORDER — SILDENAFIL 100 MG/1
100 TABLET, FILM COATED ORAL DAILY PRN
Qty: 12 TABLET | Refills: 1 | Status: SHIPPED | OUTPATIENT
Start: 2024-11-01

## 2024-11-01 NOTE — TELEPHONE ENCOUNTER
Pt wants to know if he can get a sildenafil prescription refilled. He said he was prescribed it previously at [facility]

## 2024-11-01 NOTE — TELEPHONE ENCOUNTER
Medication Request  Patient had an OV - 10/10/2024  External Provider initially prescribed  Please follow up with patient - [phone]

## 2025-02-24 ENCOUNTER — TELEPHONE (OUTPATIENT)
Dept: CARDIOLOGY CLINIC | Age: 55
End: 2025-02-24

## 2025-02-24 NOTE — TELEPHONE ENCOUNTER
After appointment with Gio Sainz MD if follow up with J Luis Huntley MD can schedule follow up at check out.

## 2025-02-24 NOTE — TELEPHONE ENCOUNTER
Eran called to make f/u appt with either Emeli or Yuko. Patient relayed that he noticed AFIB like symptoms 1 week ago and shared that he could not wait until Yuko appt in March.    When asked if he takes anything that aides in help with symptoms, Eran relayed that he takes what is prescribed to him.    Patient scheduled 2/25 at 8 a.m.

## 2025-02-25 ENCOUNTER — OFFICE VISIT (OUTPATIENT)
Dept: CARDIOLOGY CLINIC | Age: 55
End: 2025-02-25

## 2025-02-25 VITALS
HEIGHT: 70 IN | SYSTOLIC BLOOD PRESSURE: 124 MMHG | HEART RATE: 66 BPM | DIASTOLIC BLOOD PRESSURE: 78 MMHG | OXYGEN SATURATION: 99 % | WEIGHT: 295.2 LBS | BODY MASS INDEX: 42.26 KG/M2

## 2025-02-25 DIAGNOSIS — R00.2 PALPITATIONS: ICD-10-CM

## 2025-02-25 DIAGNOSIS — I47.10 PSVT (PAROXYSMAL SUPRAVENTRICULAR TACHYCARDIA): ICD-10-CM

## 2025-02-25 DIAGNOSIS — I25.10 CORONARY ARTERY DISEASE INVOLVING NATIVE CORONARY ARTERY OF NATIVE HEART WITHOUT ANGINA PECTORIS: Primary | ICD-10-CM

## 2025-02-25 DIAGNOSIS — I48.3 TYPICAL ATRIAL FLUTTER (HCC): ICD-10-CM

## 2025-02-25 DIAGNOSIS — I10 ESSENTIAL HYPERTENSION: ICD-10-CM

## 2025-02-25 NOTE — PROGRESS NOTES
[facility]      Cardiology Consult    Eran Griffiths  1970    February 25, 2025    Referring Physician: Elvis Alonso MD  Reason for Referral: CAD    CC: \"My heart skips a beat\"     HPI:  The patient is 54 y.o. male with a past medical history significant for sleep apnea, obesity, and hypertension who presents today for management of CAD and SVT. He was initially seen for evaluation of an abnormal stress test. He reported an episode of lightheadedness with associated diaphoresis that occurred with walking. He denied any recurrence but stated he was concerned and felt he should be seen by his primary care physician. He completed a stress test 10/14/21 that suggested reversible ischemia. He denied any chest pains when completing his stress test but the ECG portion of the stress test was normal and had no inducible chest pain. Coronary CTA showed minimal nonobstructive CAD. Stress test defects likely artifactual. He sought treatment in the ED 5/21/22 for palpitations that he described as his heart \"racing.\" He felt quite unwell during the event with associated chest discomfort and dyspnea. EMS reported the patient was in SVT with a rate of 200 and converted to sinus with valsalva maneuver but the actual EKG was not available in office. He was referred to Dr. Huntley to discuss possible ablation, but preferred conservative management. He sought treatment in the ED 2/4/23 for palpitations, and found to be in typical atrial flutter. He ultimately underwent a typical atrial flutter and SVT ablation with Dr. Huntley on 3/13/23.  Today, he presents for an acute visit for further evaluation of palpitations. He has a difficult time describing the sensation but essentially says it is a \"skipped beat.\" The episodes resolve within seconds without particular intervention. He does not wear a smart watch or monitor his heart rate at home. The feeling is different than his atrial flutter or PSVT. He denies any

## 2025-02-27 ENCOUNTER — OFFICE VISIT (OUTPATIENT)
Dept: PRIMARY CARE CLINIC | Age: 55
End: 2025-02-27
Payer: COMMERCIAL

## 2025-02-27 VITALS
HEART RATE: 72 BPM | SYSTOLIC BLOOD PRESSURE: 120 MMHG | WEIGHT: 296 LBS | OXYGEN SATURATION: 97 % | BODY MASS INDEX: 42.37 KG/M2 | DIASTOLIC BLOOD PRESSURE: 72 MMHG | HEIGHT: 70 IN

## 2025-02-27 DIAGNOSIS — Z83.3 FAMILY HISTORY OF DIABETES MELLITUS (DM): ICD-10-CM

## 2025-02-27 DIAGNOSIS — I47.10 SVT (SUPRAVENTRICULAR TACHYCARDIA): ICD-10-CM

## 2025-02-27 DIAGNOSIS — M79.10 MYALGIA: ICD-10-CM

## 2025-02-27 DIAGNOSIS — G47.30 SLEEP APNEA, UNSPECIFIED TYPE: ICD-10-CM

## 2025-02-27 DIAGNOSIS — R53.83 FATIGUE, UNSPECIFIED TYPE: Primary | ICD-10-CM

## 2025-02-27 DIAGNOSIS — R53.83 FATIGUE, UNSPECIFIED TYPE: ICD-10-CM

## 2025-02-27 LAB
25(OH)D3 SERPL-MCNC: 27.7 NG/ML
ALBUMIN SERPL-MCNC: 4.4 G/DL (ref 3.4–5)
ALP SERPL-CCNC: 82 U/L (ref 40–129)
ALT SERPL-CCNC: 32 U/L (ref 10–40)
ANION GAP SERPL CALCULATED.3IONS-SCNC: 10 MMOL/L (ref 3–16)
AST SERPL-CCNC: 20 U/L (ref 15–37)
BASOPHILS # BLD: 0 K/UL (ref 0–0.2)
BASOPHILS NFR BLD: 0.4 %
BILIRUB DIRECT SERPL-MCNC: 0.5 MG/DL (ref 0–0.3)
BILIRUB INDIRECT SERPL-MCNC: 0.7 MG/DL (ref 0–1)
BILIRUB SERPL-MCNC: 1.2 MG/DL (ref 0–1)
BUN SERPL-MCNC: 11 MG/DL (ref 7–20)
CALCIUM SERPL-MCNC: 9.4 MG/DL (ref 8.3–10.6)
CHLORIDE SERPL-SCNC: 105 MMOL/L (ref 99–110)
CHOLEST SERPL-MCNC: 85 MG/DL (ref 0–199)
CK SERPL-CCNC: 121 U/L (ref 39–308)
CO2 SERPL-SCNC: 24 MMOL/L (ref 21–32)
CREAT SERPL-MCNC: 0.7 MG/DL (ref 0.9–1.3)
CRP SERPL-MCNC: <3 MG/L (ref 0–5.1)
DEPRECATED RDW RBC AUTO: 13.1 % (ref 12.4–15.4)
EOSINOPHIL # BLD: 0.2 K/UL (ref 0–0.6)
EOSINOPHIL NFR BLD: 2.8 %
ERYTHROCYTE [SEDIMENTATION RATE] IN BLOOD BY WESTERGREN METHOD: 7 MM/HR (ref 0–20)
EST. AVERAGE GLUCOSE BLD GHB EST-MCNC: 105.4 MG/DL
FOLATE SERPL-MCNC: 15 NG/ML (ref 4.78–24.2)
GFR SERPLBLD CREATININE-BSD FMLA CKD-EPI: >90 ML/MIN/{1.73_M2}
GLUCOSE SERPL-MCNC: 115 MG/DL (ref 70–99)
HBA1C MFR BLD: 5.3 %
HCT VFR BLD AUTO: 48.6 % (ref 40.5–52.5)
HDLC SERPL-MCNC: 34 MG/DL (ref 40–60)
HGB BLD-MCNC: 16.7 G/DL (ref 13.5–17.5)
LDLC SERPL CALC-MCNC: 34 MG/DL
LYMPHOCYTES # BLD: 2 K/UL (ref 1–5.1)
LYMPHOCYTES NFR BLD: 31.7 %
MCH RBC QN AUTO: 30.8 PG (ref 26–34)
MCHC RBC AUTO-ENTMCNC: 34.3 G/DL (ref 31–36)
MCV RBC AUTO: 89.7 FL (ref 80–100)
MONOCYTES # BLD: 0.5 K/UL (ref 0–1.3)
MONOCYTES NFR BLD: 8.1 %
NEUTROPHILS # BLD: 3.7 K/UL (ref 1.7–7.7)
NEUTROPHILS NFR BLD: 57 %
PLATELET # BLD AUTO: 202 K/UL (ref 135–450)
PMV BLD AUTO: 8.7 FL (ref 5–10.5)
POTASSIUM SERPL-SCNC: 4.1 MMOL/L (ref 3.5–5.1)
PROT SERPL-MCNC: 6.9 G/DL (ref 6.4–8.2)
RBC # BLD AUTO: 5.42 M/UL (ref 4.2–5.9)
SODIUM SERPL-SCNC: 139 MMOL/L (ref 136–145)
TRIGL SERPL-MCNC: 86 MG/DL (ref 0–150)
TSH SERPL DL<=0.005 MIU/L-ACNC: 1.13 UIU/ML (ref 0.27–4.2)
VIT B12 SERPL-MCNC: 414 PG/ML (ref 211–911)
VLDLC SERPL CALC-MCNC: 17 MG/DL
WBC # BLD AUTO: 6.4 K/UL (ref 4–11)

## 2025-02-27 PROCEDURE — 3074F SYST BP LT 130 MM HG: CPT | Performed by: FAMILY MEDICINE

## 2025-02-27 PROCEDURE — 3078F DIAST BP <80 MM HG: CPT | Performed by: FAMILY MEDICINE

## 2025-02-27 PROCEDURE — 3017F COLORECTAL CA SCREEN DOC REV: CPT | Performed by: FAMILY MEDICINE

## 2025-02-27 PROCEDURE — G8427 DOCREV CUR MEDS BY ELIG CLIN: HCPCS | Performed by: FAMILY MEDICINE

## 2025-02-27 PROCEDURE — 99214 OFFICE O/P EST MOD 30 MIN: CPT | Performed by: FAMILY MEDICINE

## 2025-02-27 PROCEDURE — G8417 CALC BMI ABV UP PARAM F/U: HCPCS | Performed by: FAMILY MEDICINE

## 2025-02-27 PROCEDURE — 1036F TOBACCO NON-USER: CPT | Performed by: FAMILY MEDICINE

## 2025-02-27 SDOH — ECONOMIC STABILITY: FOOD INSECURITY: WITHIN THE PAST 12 MONTHS, THE FOOD YOU BOUGHT JUST DIDN'T LAST AND YOU DIDN'T HAVE MONEY TO GET MORE.: NEVER TRUE

## 2025-02-27 SDOH — ECONOMIC STABILITY: FOOD INSECURITY: WITHIN THE PAST 12 MONTHS, YOU WORRIED THAT YOUR FOOD WOULD RUN OUT BEFORE YOU GOT MONEY TO BUY MORE.: NEVER TRUE

## 2025-02-27 ASSESSMENT — PATIENT HEALTH QUESTIONNAIRE - PHQ9
SUM OF ALL RESPONSES TO PHQ9 QUESTIONS 1 & 2: 0
2. FEELING DOWN, DEPRESSED OR HOPELESS: NOT AT ALL
SUM OF ALL RESPONSES TO PHQ QUESTIONS 1-9: 0
SUM OF ALL RESPONSES TO PHQ QUESTIONS 1-9: 0
1. LITTLE INTEREST OR PLEASURE IN DOING THINGS: NOT AT ALL
SUM OF ALL RESPONSES TO PHQ QUESTIONS 1-9: 0
SUM OF ALL RESPONSES TO PHQ QUESTIONS 1-9: 0

## 2025-02-27 ASSESSMENT — ENCOUNTER SYMPTOMS
GASTROINTESTINAL NEGATIVE: 1
EYES NEGATIVE: 1
RESPIRATORY NEGATIVE: 1

## 2025-02-27 NOTE — PROGRESS NOTES
SUBJECTIVE:  Patient ID: Eran Griffiths is a 54 y.o. y.o. male     HPI   Fatigue: Patient complains of fatigue. Symptoms began several months ago. Sentinal symptom the patient feels fatigue began with: symptoms of arthritis and witnessed or suspected sleep apnea. Symptoms of his fatigue have been general malaise. Patient describes the following psychologic symptoms: none.  Patient denies fever, significant change in weight, exercise intolerance, unusual rashes, and cold intolerance, constipation and change in hair texture.. Symptoms have been intermittent. Severity has been moderate. Previous visits for this problem: none.     Past Medical History:   Diagnosis Date    Atrial flutter (HCC)     Hyperlipidemia     Hypertension     PSVT (paroxysmal supraventricular tachycardia)     Sleep apnea       Past Surgical History:   Procedure Laterality Date    ROTATOR CUFF REPAIR Right      Family History   Problem Relation Age of Onset    Other Other         nka family hx sleep apnea     Heart Attack Father     Hypertension Paternal Uncle      Social History     Socioeconomic History    Marital status:      Spouse name: None    Number of children: None    Years of education: None    Highest education level: None   Tobacco Use    Smoking status: Former     Current packs/day: 0.00     Average packs/day: 1 pack/day for 31.8 years (31.8 ttl pk-yrs)     Types: Cigarettes     Start date: 1988     Quit date: 10/15/2019     Years since quittin.3     Passive exposure: Never    Smokeless tobacco: Never   Vaping Use    Vaping status: Never Used   Substance and Sexual Activity    Alcohol use: Not Currently     Comment: rarely    Drug use: Never    Sexual activity: Yes     Partners: Female     Social Determinants of Health     Financial Resource Strain: Low Risk  (2024)    Overall Financial Resource Strain (CARDIA)     Difficulty of Paying Living Expenses: Not hard at all   Food Insecurity: No Food Insecurity

## 2025-02-28 RX ORDER — ERGOCALCIFEROL 1.25 MG/1
50000 CAPSULE, LIQUID FILLED ORAL WEEKLY
Qty: 12 CAPSULE | Refills: 1 | Status: SHIPPED | OUTPATIENT
Start: 2025-02-28

## 2025-03-03 NOTE — PROGRESS NOTES
short run of atrial tachycardia, 3 beats  PAC and PVC burden less than 1%  Patient reported palpitations during PACs and PVCs    6/28/2023-7/27/2023  Sinus rhythm    2. Echo 01/04/2024   Summary   Left ventricular cavity size is normal. Normal left ventricular wall   thickness. Overall left ventricular systolic function appears normal with an   ejection fraction of 55-60%. No regional wall motion abnormalities are   noted. Normal diastolic function.   The left atrium is normal in size. A bubble study was performed and fails to   show evidence of right to left shunting.   Normal function of all valves.    Echo: 6/17/2022  Summary   Left ventricular cavity size is normal with mild concentric left ventricular   hypertrophy.   Overall left ventricular systolic function appears normal with an ejection   fraction of 55-60.   No regional wall motion abnormalities   Normal diastolic function.   Estimated pulmonary artery systolic pressure is at 28 mmHg assuming a right   atrial pressure of 8 mmHg.   The right ventricle is enlarged.   TAPSE measures 2.25 cm and the RVS velocity measures 18.8 cm/s. IVC size is   normal (<2.1 cm) but collapses < 50% with respiration consistent with   elevated RA pressure (8 mmHg).    3. Stress Test:  10/14/2021   Normal treadmill exercise stress portion of the study.  Moderate size multiple reversible perfusion defects involving the apex,  anterior and inferior walls suggestive of ischemia  Gated Study shows normal LV size and Systolic function; EF is 63%.  The patient exercised for 6 min. on the Max protocol to achieve a HR of  150, which is 88% of PMHR. The study was stopped due to shortness of breath& exhaustion. There was no chest pain or ischemic ST changes.    4. Cath: n/a    5. Coronary CTA 1/11/2022  Calcium score measures 14.  Coronary artery origins appear normal.  Scattered  plaque-like luminal irregularities are seen, with no flow limiting coronary  artery stenosis noted with the

## 2025-03-06 RX ORDER — METOPROLOL SUCCINATE 50 MG/1
50 TABLET, EXTENDED RELEASE ORAL DAILY
Qty: 30 TABLET | Refills: 3 | Status: SHIPPED | OUTPATIENT
Start: 2025-03-06 | End: 2025-03-12

## 2025-03-12 ENCOUNTER — OFFICE VISIT (OUTPATIENT)
Dept: CARDIOLOGY CLINIC | Age: 55
End: 2025-03-12
Payer: COMMERCIAL

## 2025-03-12 VITALS
SYSTOLIC BLOOD PRESSURE: 132 MMHG | BODY MASS INDEX: 42.66 KG/M2 | OXYGEN SATURATION: 94 % | DIASTOLIC BLOOD PRESSURE: 84 MMHG | HEART RATE: 70 BPM | WEIGHT: 298 LBS | HEIGHT: 70 IN

## 2025-03-12 DIAGNOSIS — I48.3 TYPICAL ATRIAL FLUTTER (HCC): Primary | ICD-10-CM

## 2025-03-12 PROCEDURE — 3017F COLORECTAL CA SCREEN DOC REV: CPT | Performed by: INTERNAL MEDICINE

## 2025-03-12 PROCEDURE — 3079F DIAST BP 80-89 MM HG: CPT | Performed by: INTERNAL MEDICINE

## 2025-03-12 PROCEDURE — 99214 OFFICE O/P EST MOD 30 MIN: CPT | Performed by: INTERNAL MEDICINE

## 2025-03-12 PROCEDURE — 1036F TOBACCO NON-USER: CPT | Performed by: INTERNAL MEDICINE

## 2025-03-12 PROCEDURE — 93000 ELECTROCARDIOGRAM COMPLETE: CPT | Performed by: INTERNAL MEDICINE

## 2025-03-12 PROCEDURE — G8427 DOCREV CUR MEDS BY ELIG CLIN: HCPCS | Performed by: INTERNAL MEDICINE

## 2025-03-12 PROCEDURE — G8417 CALC BMI ABV UP PARAM F/U: HCPCS | Performed by: INTERNAL MEDICINE

## 2025-03-12 PROCEDURE — 3075F SYST BP GE 130 - 139MM HG: CPT | Performed by: INTERNAL MEDICINE

## 2025-03-12 RX ORDER — METOPROLOL SUCCINATE 25 MG/1
25 TABLET, EXTENDED RELEASE ORAL DAILY
Qty: 90 TABLET | Refills: 1 | Status: SHIPPED | OUTPATIENT
Start: 2025-03-12

## 2025-03-12 RX ORDER — ATORVASTATIN CALCIUM 40 MG/1
40 TABLET, FILM COATED ORAL DAILY
Qty: 90 TABLET | Refills: 3 | OUTPATIENT
Start: 2025-03-12

## 2025-03-12 RX ORDER — ATORVASTATIN CALCIUM 40 MG/1
40 TABLET, FILM COATED ORAL DAILY
Qty: 90 TABLET | Refills: 3 | Status: SHIPPED | OUTPATIENT
Start: 2025-03-12

## 2025-03-12 RX ORDER — METOPROLOL SUCCINATE 50 MG/1
25 TABLET, EXTENDED RELEASE ORAL DAILY
Qty: 45 TABLET | Refills: 1
Start: 2025-03-12 | End: 2025-03-12 | Stop reason: SDUPTHER

## 2025-03-12 RX ORDER — METOPROLOL SUCCINATE 50 MG/1
25 TABLET, EXTENDED RELEASE ORAL DAILY
Qty: 45 TABLET | Refills: 1 | Status: SHIPPED | OUTPATIENT
Start: 2025-03-12 | End: 2025-03-12

## 2025-03-12 NOTE — TELEPHONE ENCOUNTER
Last OV: 3/12/25  with Dr Huntley  Next OV: 9/3/25   Dr Huntley  Last refill: TODAY #90 3 refills.  Most recent Labs: 2/27/25  Last EKG (if needed): 3/12/25

## 2025-03-12 NOTE — TELEPHONE ENCOUNTER
Patient seen in clinic today with Dr. Huntley. He is requesting a refill on Lipitor. He would like for rx to be sent to [facility] in [place] on [address]    Last OV: 02/25/2025 with Dr. Sainz     Last Labs: 02/27/2025 Lipid Panel     Last Refill: 11/01/2023 #90 with 3 refills    Next Appointment: on recall list for annual follow up with Dr. Sainz

## 2025-03-25 ENCOUNTER — TELEPHONE (OUTPATIENT)
Dept: CARDIOLOGY CLINIC | Age: 55
End: 2025-03-25

## 2025-03-25 RX ORDER — ATORVASTATIN CALCIUM 40 MG/1
20 TABLET, FILM COATED ORAL DAILY
Qty: 90 TABLET | Refills: 3
Start: 2025-03-25

## 2025-03-25 NOTE — TELEPHONE ENCOUNTER
Left VM to return call.     Would advise to trial Lipitor 20 mg daily to see if he tolerate this dose, if he is agreeable.

## 2025-03-25 NOTE — TELEPHONE ENCOUNTER
Eran called stating that when is stopped taking atorvastatin is joints and bones stopped hurting.  Now that he is back on the medication he is hurting again would like to know if there is something else that he take.    Call back number [phone]

## 2025-03-25 NOTE — TELEPHONE ENCOUNTER
Called patient he will try Lipitor 20mg for a week and see how he feels  He will call us back if he wants it changed

## 2025-05-21 RX ORDER — HYDROCHLOROTHIAZIDE 25 MG/1
25 TABLET ORAL DAILY
Qty: 90 TABLET | Refills: 0 | Status: SHIPPED | OUTPATIENT
Start: 2025-05-21

## 2025-05-21 NOTE — TELEPHONE ENCOUNTER
Medication Refill    Medication needing refilled:  hydroCHLOROthiazide (HYDRODIURIL)     Dosage of the medication:  25 MG tablet     How are you taking this medication (QD, BID, TID, QID, PRN):   Take 1 tablet by mouth daily     30 or 90 day supply called in:  90 tablet     Which Pharmacy are we sending the medication to?:    [id number] - [place], [place] - [address] - P [phone] - F [phone]  [address] [address]  Phone: [phone]  Fax: [phone]

## 2025-05-21 NOTE — TELEPHONE ENCOUNTER
Last OV: 03/12/25  Next OV: 09/03/25  Last refill:03/29/24  Most recent Labs: BMP 02/27/25  Last EKG (if needed):03/12/25

## 2025-07-28 ENCOUNTER — OFFICE VISIT (OUTPATIENT)
Age: 55
End: 2025-07-28
Payer: COMMERCIAL

## 2025-07-28 VITALS
WEIGHT: 314.16 LBS | DIASTOLIC BLOOD PRESSURE: 68 MMHG | HEIGHT: 70 IN | BODY MASS INDEX: 44.98 KG/M2 | OXYGEN SATURATION: 98 % | SYSTOLIC BLOOD PRESSURE: 128 MMHG | HEART RATE: 69 BPM

## 2025-07-28 DIAGNOSIS — I10 ESSENTIAL HYPERTENSION: ICD-10-CM

## 2025-07-28 DIAGNOSIS — G47.33 OSA (OBSTRUCTIVE SLEEP APNEA): Primary | ICD-10-CM

## 2025-07-28 DIAGNOSIS — E66.01 MORBID OBESITY WITH BMI OF 45.0-49.9, ADULT (HCC): ICD-10-CM

## 2025-07-28 PROCEDURE — G8417 CALC BMI ABV UP PARAM F/U: HCPCS | Performed by: STUDENT IN AN ORGANIZED HEALTH CARE EDUCATION/TRAINING PROGRAM

## 2025-07-28 PROCEDURE — 3017F COLORECTAL CA SCREEN DOC REV: CPT | Performed by: STUDENT IN AN ORGANIZED HEALTH CARE EDUCATION/TRAINING PROGRAM

## 2025-07-28 PROCEDURE — G8427 DOCREV CUR MEDS BY ELIG CLIN: HCPCS | Performed by: STUDENT IN AN ORGANIZED HEALTH CARE EDUCATION/TRAINING PROGRAM

## 2025-07-28 PROCEDURE — G2211 COMPLEX E/M VISIT ADD ON: HCPCS | Performed by: STUDENT IN AN ORGANIZED HEALTH CARE EDUCATION/TRAINING PROGRAM

## 2025-07-28 PROCEDURE — 99204 OFFICE O/P NEW MOD 45 MIN: CPT | Performed by: STUDENT IN AN ORGANIZED HEALTH CARE EDUCATION/TRAINING PROGRAM

## 2025-07-28 PROCEDURE — 3078F DIAST BP <80 MM HG: CPT | Performed by: STUDENT IN AN ORGANIZED HEALTH CARE EDUCATION/TRAINING PROGRAM

## 2025-07-28 PROCEDURE — 3074F SYST BP LT 130 MM HG: CPT | Performed by: STUDENT IN AN ORGANIZED HEALTH CARE EDUCATION/TRAINING PROGRAM

## 2025-07-28 PROCEDURE — 1036F TOBACCO NON-USER: CPT | Performed by: STUDENT IN AN ORGANIZED HEALTH CARE EDUCATION/TRAINING PROGRAM

## 2025-07-28 ASSESSMENT — SLEEP AND FATIGUE QUESTIONNAIRES
HOW LIKELY ARE YOU TO NOD OFF OR FALL ASLEEP IN A CAR, WHILE STOPPED FOR A FEW MINUTES IN TRAFFIC: HIGH CHANCE OF DOZING
HOW LIKELY ARE YOU TO NOD OFF OR FALL ASLEEP WHILE LYING DOWN TO REST IN THE AFTERNOON WHEN CIRCUMSTANCES PERMIT: HIGH CHANCE OF DOZING
HOW LIKELY ARE YOU TO NOD OFF OR FALL ASLEEP WHILE SITTING AND TALKING TO SOMEONE: MODERATE CHANCE OF DOZING
HOW LIKELY ARE YOU TO NOD OFF OR FALL ASLEEP WHILE SITTING INACTIVE IN A PUBLIC PLACE: HIGH CHANCE OF DOZING
HOW LIKELY ARE YOU TO NOD OFF OR FALL ASLEEP WHILE WATCHING TV: HIGH CHANCE OF DOZING
HOW LIKELY ARE YOU TO NOD OFF OR FALL ASLEEP WHILE SITTING AND READING: HIGH CHANCE OF DOZING
HOW LIKELY ARE YOU TO NOD OFF OR FALL ASLEEP WHEN YOU ARE A PASSENGER IN A CAR FOR AN HOUR WITHOUT A BREAK: HIGH CHANCE OF DOZING
ESS TOTAL SCORE: 23
HOW LIKELY ARE YOU TO NOD OFF OR FALL ASLEEP WHILE SITTING QUIETLY AFTER LUNCH WITHOUT ALCOHOL: HIGH CHANCE OF DOZING

## 2025-07-28 NOTE — PROGRESS NOTES
[facility]  Sleep Medicine  [address], [address]  [place] [address]    Chief Complaint   Patient presents with    New Patient     NP - had ss done 2022, has cpap not using       Eran GEOFFREY Griffiths is a 54 y.o. male who comes in for evaluation of previously diagnosed THERESA. He was diagnosed in 2019. Patient used to be on PAP therapy but patient stopped PAP therapy about 1 year ago.    Pertinent PMHx includes: THERESA, hypertension, severe obesity.    He goes to bed at 10:30pm to 11pm. He falls asleep in few minutes.  He awakens 0-1 times per night (usually to use the bathroom), and sometimes it is hard to fall back to sleep. The average total amount of sleep is about 6.5-7.5 hrs hours per night and takes naps. He does not use sleep aid/s. He continues to have complaints of daytime sleepiness, fatigue, snoring, and witnessed apneas. He stopped using CPAP due to pressure being too high.     Caffeinated drinks/day: 2-3 pops a day  Alcohol drinks/day/week: none  Occupation: contractor      DATA REVIEWED TODAY:    Diagnostic Review  PSG on 3/31/2019 showed apnea hypopnea index of 71.7/h with oxygen desaturation down to a mookie of 73%.       Rutledge & Insomnia Severity Index scores      7/28/2025     2:36 PM 4/18/2022     8:02 AM 3/18/2019     8:20 AM   Sleep Medicine   Sitting and reading 3 2 2   Watching TV 3 2 3   Sitting, inactive in a public place (e.g. a theatre or a meeting) 3 3 2   As a passenger in a car for an hour without a break 3 2 2   Lying down to rest in the afternoon when circumstances permit 3 2 2   Sitting and talking to someone 2 1 1   Sitting quietly after a lunch without alcohol 3 2 2   In a car, while stopped for a few minutes in traffic 3 1 2   R[place]ledge Sleepiness Score 23 15 16   Neck (Inches) 19  21         7/28/2025     2:00 PM   Insomnia Severity Index (COV)   Difficulty falling asleep 1   Difficulty staying asleep 1   Problems waking up too early 0   How

## 2025-07-28 NOTE — PATIENT INSTRUCTIONS
THERESA education:    You have sleep apnea:      Untreated sleep apnea is associated with increased risk of hypertension, cardiac disease, myocardial infarction, stroke, and poor blood sugar control. Treating your THERESA can decrease these risks.    Weight loss does improve sleep apnea. It is important to have a healthy diet and an exercise program.     Alcohol and sedating medicine can make sleep apnea worse and should be avoided in particular before sleep.    If you have surgery or are hospitalized, tell your doctor that you have sleep apnea and bring your CPAP to the hospital.    If you are drowsy or sleepy, you should not drive. If you are driving and become drowsy or sleepy, you should pull over to a safe place. Below are our drowsy driving tips.     PAP machine care:   You should clean your PAP regularly (see your PAP  site for more details)  Daily cleaning   1. Wipe mask with a damp towel with mild detergent, rinse with a damp towel and let air dry. You can also see CPAP cleaning wipes. Avoid harsh cleaning wipes or chemicals.    2. Humidifier- empty water daily. Fill with clean distilled water before use before sleep.    Weekly Cleaning   1. Clean mask, headgear, and tubing weekly  2. Fill your sink with warm water and a few drops of mild dish soap. Swirl equipment for at least 5 minutes. Rinse well and air dry. Hang hose over something so the water droplets drip out.   3. Clean filter- rinse with warm water, squeeze excess water and blot dry with towel. If there is a white filter (respironics machine)- do not wash that - it is disposable and should be changed once a month.   4. Humidifier- Disinfect in solution that is one part vinegar and 5 parts water for 30 min. You can also put it in the top rack of  to clean.     Ask your medical equipment company about renewal of your supplies. At the very least- this should be done once a year.       Weight Loss  Weight Loss is an important part of

## 2025-09-03 RX ORDER — HYDROCHLOROTHIAZIDE 25 MG/1
25 TABLET ORAL DAILY
Qty: 90 TABLET | Refills: 0 | Status: SHIPPED | OUTPATIENT
Start: 2025-09-03